# Patient Record
Sex: MALE | Race: WHITE | NOT HISPANIC OR LATINO | Employment: FULL TIME | URBAN - METROPOLITAN AREA
[De-identification: names, ages, dates, MRNs, and addresses within clinical notes are randomized per-mention and may not be internally consistent; named-entity substitution may affect disease eponyms.]

---

## 2017-02-16 ENCOUNTER — OFFICE VISIT (OUTPATIENT)
Dept: FAMILY MEDICINE | Facility: CLINIC | Age: 22
End: 2017-02-16
Payer: COMMERCIAL

## 2017-02-16 VITALS
SYSTOLIC BLOOD PRESSURE: 126 MMHG | HEART RATE: 65 BPM | WEIGHT: 176.8 LBS | DIASTOLIC BLOOD PRESSURE: 79 MMHG | BODY MASS INDEX: 23.95 KG/M2 | HEIGHT: 72 IN | TEMPERATURE: 97.1 F | OXYGEN SATURATION: 100 %

## 2017-02-16 DIAGNOSIS — M67.40 GANGLION CYST: Primary | ICD-10-CM

## 2017-02-16 DIAGNOSIS — Z23 NEED FOR PROPHYLACTIC VACCINATION WITH TETANUS-DIPHTHERIA (TD): ICD-10-CM

## 2017-02-16 DIAGNOSIS — Z23 NEED FOR PROPHYLACTIC VACCINATION WITH COMBINED VACCINE: ICD-10-CM

## 2017-02-16 PROCEDURE — 99213 OFFICE O/P EST LOW 20 MIN: CPT | Mod: 25 | Performed by: FAMILY MEDICINE

## 2017-02-16 PROCEDURE — 90471 IMMUNIZATION ADMIN: CPT | Performed by: FAMILY MEDICINE

## 2017-02-16 PROCEDURE — 90715 TDAP VACCINE 7 YRS/> IM: CPT | Performed by: FAMILY MEDICINE

## 2017-02-16 NOTE — NURSING NOTE
Screening Questionnaire for Adult Immunization    Are you sick today?   No   Do you have allergies to medications, food, a vaccine component or latex?   No   Have you ever had a serious reaction after receiving a vaccination?   No   Do you have a long-term health problem with heart disease, lung disease, asthma, kidney disease, metabolic disease (e.g. diabetes), anemia, or other blood disorder?   No   Do you have cancer, leukemia, HIV/AIDS, or any other immune system problem?   No   In the past 3 months, have you taken medications that affect  your immune system, such as prednisone, other steroids, or anticancer drugs; drugs for the treatment of rheumatoid arthritis, Crohn s disease, or psoriasis; or have you had radiation treatments?   No   Have you had a seizure, or a brain or other nervous system problem?   No   During the past year, have you received a transfusion of blood or blood     products, or been given immune (gamma) globulin or antiviral drug?   No   For women: Are you pregnant or is there a chance you could become        pregnant during the next month?   No   Have you received any vaccinations in the past 4 weeks?   No     Immunization questionnaire answers were all negative.      MNVFC doesn't apply on this patient   Patient instructed to remain in clinic for 20 minutes afterwards, and to report any adverse reaction to me immediately.       Screening performed by Clary Ruiz on 2/16/2017 at 9:31 AM.

## 2017-02-16 NOTE — MR AVS SNAPSHOT
After Visit Summary   2/16/2017    Vijay Coreas    MRN: [id number]           Patient Information     Date Of Birth          1995        Visit Information        Provider Department      2/16/2017 8:45 AM Leobardo Daily MD [facility]        Today's Diagnoses     Ganglion cyst    -  1    Need for prophylactic vaccination with tetanus-diphtheria (TD)        Need for prophylactic vaccination with combined vaccine           Follow-ups after your visit        Additional Services     ORTHOPEDICS ADULT REFERRAL       Your provider has referred you to: [facility] [phone]   [email or web address]    Please be aware that coverage of these services is subject to the terms and limitations of your health insurance plan.  Call member services at your health plan with any benefit or coverage questions.      Please bring the following to your appointment:    >>   Any x-rays, CTs or MRIs which have been performed.  Contact the facility where they were done to arrange for  prior to your scheduled appointment.    >>   List of current medications   >>   This referral request   >>   Any documents/labs given to you for this referral                  Future tests that were ordered for you today     Open Standing Orders        Priority Remaining Interval Expires Ordered    HUMAN PAPILLOMA VIRUS (GARDASIL 9) VACCINE Routine 3/3  1/16/2018 2/16/2017            Who to contact     If you have questions or need follow up information about today's clinic visit or your schedule please contact [facility] directly at [phone].  Normal or non-critical lab and imaging results will be communicated to you by MyChart, letter or phone within 4 business days after the clinic has received the results. If you do not hear from us within 7 days, please contact the clinic through MyChart or phone. If you have a critical or abnormal lab result, we will  notify you by phone as soon as possible.  Submit refill requests through [employer] or call your pharmacy and they will forward the refill request to us. Please allow 3 business days for your refill to be completed.          Additional Information About Your Visit        [employer] Information     [employer] gives you secure access to your electronic health record. If you see a primary care provider, you can also send messages to your care team and make appointments. If you have questions, please call your primary care clinic.  If you do not have a primary care provider, please call [phone] and they will assist you.        Care EveryWhere ID     This is your Care EveryWhere ID. This could be used by other organizations to access your [place] medical records  [id number]        Your Vitals Were     Pulse Temperature Height Pulse Oximetry BMI (Body Mass Index)       65 97.1  F (36.2  C) (Oral) 6' (1.829 m) 100% 23.98 kg/m2        Blood Pressure from Last 3 Encounters:   02/16/17 126/79   11/02/16 100/60   10/07/15 134/89    Weight from Last 3 Encounters:   02/16/17 176 lb 12.8 oz (80.2 kg)   11/02/16 176 lb (79.8 kg)   07/28/15 173 lb 8 oz (78.7 kg)              We Performed the Following     ORTHOPEDICS ADULT REFERRAL     TDAP (ADACEL AGES 11-64)        Primary Care Provider Office Phone # Fax #    Leobardo Daily [phone]       [facility] [address] [address]        Thank you!     Thank you for choosing [facility]  for your care. Our goal is always to provide you with excellent care. Hearing back from our patients is one way we can continue to improve our services. Please take a few minutes to complete the written survey that you may receive in the mail after your visit with us. Thank you!             Your Updated Medication List - Protect others around you: Learn how to safely use, store and throw away your medicines at www.disposemymeds.org.           This list is accurate as of: 2/16/17 11:44 AM.  Always use your most recent med list.                   Brand Name Dispense Instructions for use    HYDROcodone-acetaminophen 5-325 MG per tablet    NORCO    15 tablet    Take 1-2 tablets by mouth every 4 hours as needed for moderate to severe pain

## 2017-02-16 NOTE — NURSING NOTE
Chief Complaint   Patient presents with     Derm Problem     lump on right wriste, doing anything physical it  swells up and is painful  -      /79  Pulse 65  Temp 97.1  F (36.2  C) (Oral)  Ht 6' (1.829 m)  Wt 176 lb 12.8 oz (80.2 kg)  SpO2 100%  BMI 23.98 kg/m2 Estimated body mass index is 23.98 kg/(m^2) as calculated from the following:    Height as of this encounter: 6' (1.829 m).    Weight as of this encounter: 176 lb 12.8 oz (80.2 kg).  BP completed using cuff size: regular       Health Maintenance due pending provider review:  NONE    n/a      Clary Ruiz CMA

## 2017-02-16 NOTE — PROGRESS NOTES
Subjective: We have talked about the ganglion cyst on his right wrist in the pastbut it really didn't bother him. He thinks it's been there for years. Now if he does workouts it will get really swollen, twice its normal size, and hurt for days. He also needs some immunization updating and we talked about HPV and he'll think about it.    Objective: There is a small ganglion cyst on the right dorsum of the wrist. It is not particularly painful    Assessment and plan: Small ganglion cyst right wrist. It is too small for me to hope to drain and inject but I referred him to orthopedics to do that. Since it's causing somatic symptoms I think it makes sense. The cough

## 2017-12-11 ENCOUNTER — OFFICE VISIT (OUTPATIENT)
Dept: FAMILY MEDICINE | Facility: CLINIC | Age: 22
End: 2017-12-11
Payer: COMMERCIAL

## 2017-12-11 VITALS
WEIGHT: 180.4 LBS | HEART RATE: 66 BPM | DIASTOLIC BLOOD PRESSURE: 74 MMHG | OXYGEN SATURATION: 100 % | HEIGHT: 72 IN | SYSTOLIC BLOOD PRESSURE: 116 MMHG | TEMPERATURE: 97.9 F | BODY MASS INDEX: 24.43 KG/M2

## 2017-12-11 DIAGNOSIS — R21 RASH: Primary | ICD-10-CM

## 2017-12-11 PROCEDURE — 99213 OFFICE O/P EST LOW 20 MIN: CPT | Performed by: FAMILY MEDICINE

## 2017-12-11 RX ORDER — CLINDAMYCIN PHOSPHATE 11.9 MG/ML
SOLUTION TOPICAL 2 TIMES DAILY
Qty: 60 ML | Refills: 11 | Status: SHIPPED | OUTPATIENT
Start: 2017-12-11

## 2017-12-11 NOTE — PROGRESS NOTES
SUBJECTIVE:   Vijay Coreas is a 22 year old male who presents to clinic today for the following health issues:    Rash      Duration: 1 week    Description  Location: Lt armpit  Itching: some itch and uncomfortable    Intensity:  mild    Accompanying signs and symptoms: uncomfortable,some congestion/allergies and minor cold sxs    History (similar episodes/previous evaluation): None    Precipitating or alleviating factors:  New exposures:  None  Recent travel: no      Therapies tried and outcome: hydrocortisone cream, anti-fungal -  not effective      22-year-old who presents to clinic for evaluation of skin rash that have been present for the past 1 week.  Patient reports that he wear and use sweater 1 week ago and he felt that he was too tight on his armpits.  It is only under his left armpit.  He tried topical hydrocortisone cream without relief.  Yesterday he tried topical antifungal cream without relief as well.  Rash has been the same over the past 1 week.  Yesterday he also noticed a similar rash on his suprapubic area.      Reviewed and updated as needed this visit by clinical staff      ROS:  Constitutional, HEENT, cardiovascular, pulmonary, gi and gu systems are negative, except as otherwise noted.      OBJECTIVE:   /74  Pulse 66  Temp 97.9  F (36.6  C) (Oral)  Ht 6' (1.829 m)  Wt 180 lb 6.4 oz (81.8 kg)  SpO2 100%  BMI 24.47 kg/m2  Body mass index is 24.47 kg/(m^2).  GENERAL: healthy, alert and no distress  SKIN: left axillary area: Scattered erythematous isolated papules with surrounding erythema, nonblanching. Not related to hair follicles.  Similar rash on the suprapubic area.  PSYCH: mentation appears normal, affect normal/bright      ASSESSMENT/PLAN:   1. Rash  Assessment: Rash is consistent with a bug bite related inflammatory papules.  The patient attempted topical steroids as well as antifungal ointment without relief.  He was given clindamycin solution to use on the suprapubic  area as well as left Axillary area.  Plan:  - clindamycin (CLEOCIN T) 1 % solution; Apply topically 2 times daily  Dispense: 60 mL; Refill: 11      Carolian Johnson MD  [facility]  PAGER: [phone]

## 2017-12-11 NOTE — MR AVS SNAPSHOT
After Visit Summary   12/11/2017    Vijay Coreas    MRN: [id number]           Patient Information     Date Of Birth          1995        Visit Information        Provider Department      12/11/2017 12:30 PM Carolina Johnson MD [facility]        Today's Diagnoses     Rash    -  1      Care Instructions      Understanding Contact Dermatitis     A cool, moist compress can help reduce itching.     Contact dermatitis is a common type of skin rash. It s caused by something that touches the skin and makes it irritated and inflamed. It can occur on skin on any part of the body, such as the face, neck, hands, arms, and legs. Contact dermatitis is not spread from person to person.  Often, the reaction of contact dermatitis occurs 1 to 2 days after contact with the offending agent.  How to say it  SHIRLEY-tact [id number]-VK-[id number]   What causes contact dermatitis?  It s caused by something that irritates the skin, or that creates an allergic reaction on the skin. People can get contact dermatitis from many kinds of things. These include:    Plant oils in poison ivy, oak, and sumac    Chemicals in household , solvents, and glue    Chemicals in makeup, soap, laundry detergent, perfume, acne cream, and hair products    Certain medicines, such as neomycin, bacitracin, benzocaine, and thimerosal    Metals such as nickel, found in some jewelry and watch bands     The sticky material on the back of bandages and tape (adhesive)    Things that can cause tiny breaks in the skin, such as wood, fiberglass, metal tools, and plant thorns    Rubber latex in surgical gloves and other medical supplies  Dermatitis can also be caused by the skin being damp for long periods of time. This can happen from washing your hands too often, or working with wet materials.  Symptoms of contact dermatitis  Symptoms can include skin that is:    Blistered    Burning    Cracked    Dry    Itchy    Painful    Red    Rough,  thickened, and leathery    Swollen    Warm  The blisters may ooze fluid and form crusts.  Treatment for contact dermatitis  Treatment is done to help relieve itching and reduce inflammation. The rash should go away in a few days to a few weeks. Treatments include:    Cool, moist compress. Use a clean damp cloth. Put it on the area for 20 to 30 minutes, 5 to 6 times a day for the first 3 days.    Steroid cream or ointment. You can apply this medicine several times a day on clean skin.    Oral corticosteroid. Your healthcare provider may prescribe this medicine if you have severe skin symptoms on a large part of your body.  Your healthcare provider may give you a steroid injection instead of pills.    Oral antihistamine. This medicine can help reduce itching.    Colloidal oatmeal bath. Soaking in water with colloidal oatmeal can help soothe skin.    Plain cream, lotion, or ointment. Cream, lotion, or ointment without medicine can help to soothe and protect your skin.  Living with contact dermatitis  Talk with your healthcare provider about what may have caused your contact dermatitis. Patch testing may help you figure out what caused the rash so you can avoid further contact with it. Once you learn what caused your rash, make sure to avoid that substance. If your skin comes into contact with it again, make sure to wash your skin right away. If you can t avoid the substance, wear gloves or other protective clothing before you touch it. Or use a cream, lotion, or ointment to protect your skin.  When to call your healthcare provider  Call your healthcare provider right away if you have any of these:    Fever of 100.4 F (38 C) or higher, or as directed    Symptoms that don t get better, or get worse    New symptoms   Date Last Reviewed: 5/1/2016 2000-2017 [employer]. [address] [place] [address], [place] [address]. All rights reserved. This information is not intended as a substitute for professional medical  care. Always follow your healthcare professional's instructions.                Follow-ups after your visit        Who to contact     If you have questions or need follow up information about today's clinic visit or your schedule please contact [facility] directly at [phone].  Normal or non-critical lab and imaging results will be communicated to you by [employer], letter or phone within 4 business days after the clinic has received the results. If you do not hear from us within 7 days, please contact the clinic through [employer] or phone. If you have a critical or abnormal lab result, we will notify you by phone as soon as possible.  Submit refill requests through [employer] or call your pharmacy and they will forward the refill request to us. Please allow 3 business days for your refill to be completed.          Additional Information About Your Visit        MyChart Information     [employer] gives you secure access to your electronic health record. If you see a primary care provider, you can also send messages to your care team and make appointments. If you have questions, please call your primary care clinic.  If you do not have a primary care provider, please call [phone] and they will assist you.        Care EveryWhere ID     This is your Care EveryWhere ID. This could be used by other organizations to access your [place] medical records  [id number]        Your Vitals Were     Pulse Temperature Height Pulse Oximetry BMI (Body Mass Index)       66 97.9  F (36.6  C) (Oral) 6' (1.829 m) 100% 24.47 kg/m2        Blood Pressure from Last 3 Encounters:   12/11/17 116/74   02/16/17 126/79   11/02/16 100/60    Weight from Last 3 Encounters:   12/11/17 180 lb 6.4 oz (81.8 kg)   02/16/17 176 lb 12.8 oz (80.2 kg)   11/02/16 176 lb (79.8 kg)              Today, you had the following     No orders found for display         Today's Medication Changes          These changes are accurate as of: 12/11/17 12:46 PM.   If you have any questions, ask your nurse or doctor.               Start taking these medicines.        Dose/Directions    clindamycin 1 % solution   Commonly known as:  CLEOCIN T   Used for:  Rash   Started by:  Carolina Johnson MD        Apply topically 2 times daily   Quantity:  60 mL   Refills:  11            Where to get your medicines      These medications were sent to [facility] [id number]  [place], [place] - [address]  [address] [place] [address]     Phone:  [phone]     clindamycin 1 % solution                Primary Care Provider Office Phone # Fax #    Leobardo Daily [phone] [address] [facility] [address] [address]        Equal Access to Services     LESLEY JOSEPH : Hadii sheldon drummond hadasho Soomaali, waaxda luqadaha, qaybta kaalmada adeegyada, lottie su hayluigi lópez . So [facility] [phone].    ATENCIÓN: Si habla español, tiene a mello disposición servicios gratuitos de asistencia lingüística. Llame al [phone].    We comply with applicable federal civil rights laws and [place] laws. We do not discriminate on the basis of race, color, national origin, age, disability, sex, sexual orientation, or gender identity.            Thank you!     Thank you for choosing [facility]  for your care. Our goal is always to provide you with excellent care. Hearing back from our patients is one way we can continue to improve our services. Please take a few minutes to complete the written survey that you may receive in the mail after your visit with us. Thank you!             Your Updated Medication List - Protect others around you: Learn how to safely use, store and throw away your medicines at www.disposemymeds.org.          This list is accurate as of: 12/11/17 12:46 PM.  Always use your most recent med list.                   Brand Name Dispense Instructions for use Diagnosis    clindamycin 1 %  solution    CLEOCIN T    60 mL    Apply topically 2 times daily    Rash

## 2017-12-11 NOTE — PATIENT INSTRUCTIONS
Understanding Contact Dermatitis     A cool, moist compress can help reduce itching.     Contact dermatitis is a common type of skin rash. It s caused by something that touches the skin and makes it irritated and inflamed. It can occur on skin on any part of the body, such as the face, neck, hands, arms, and legs. Contact dermatitis is not spread from person to person.  Often, the reaction of contact dermatitis occurs 1 to 2 days after contact with the offending agent.  How to say it  SHIRLEY-tact geo-anh-UI-tis   What causes contact dermatitis?  It s caused by something that irritates the skin, or that creates an allergic reaction on the skin. People can get contact dermatitis from many kinds of things. These include:    Plant oils in poison ivy, oak, and sumac    Chemicals in household , solvents, and glue    Chemicals in makeup, soap, laundry detergent, perfume, acne cream, and hair products    Certain medicines, such as neomycin, bacitracin, benzocaine, and thimerosal    Metals such as nickel, found in some jewelry and watch bands     The sticky material on the back of bandages and tape (adhesive)    Things that can cause tiny breaks in the skin, such as wood, fiberglass, metal tools, and plant thorns    Rubber latex in surgical gloves and other medical supplies  Dermatitis can also be caused by the skin being damp for long periods of time. This can happen from washing your hands too often, or working with wet materials.  Symptoms of contact dermatitis  Symptoms can include skin that is:    Blistered    Burning    Cracked    Dry    Itchy    Painful    Red    Rough, thickened, and leathery    Swollen    Warm  The blisters may ooze fluid and form crusts.  Treatment for contact dermatitis  Treatment is done to help relieve itching and reduce inflammation. The rash should go away in a few days to a few weeks. Treatments include:    Cool, moist compress. Use a clean damp cloth. Put it on the area for 20 to 30  minutes, 5 to 6 times a day for the first 3 days.    Steroid cream or ointment. You can apply this medicine several times a day on clean skin.    Oral corticosteroid. Your healthcare provider may prescribe this medicine if you have severe skin symptoms on a large part of your body.  Your healthcare provider may give you a steroid injection instead of pills.    Oral antihistamine. This medicine can help reduce itching.    Colloidal oatmeal bath. Soaking in water with colloidal oatmeal can help soothe skin.    Plain cream, lotion, or ointment. Cream, lotion, or ointment without medicine can help to soothe and protect your skin.  Living with contact dermatitis  Talk with your healthcare provider about what may have caused your contact dermatitis. Patch testing may help you figure out what caused the rash so you can avoid further contact with it. Once you learn what caused your rash, make sure to avoid that substance. If your skin comes into contact with it again, make sure to wash your skin right away. If you can t avoid the substance, wear gloves or other protective clothing before you touch it. Or use a cream, lotion, or ointment to protect your skin.  When to call your healthcare provider  Call your healthcare provider right away if you have any of these:    Fever of 100.4 F (38 C) or higher, or as directed    Symptoms that don t get better, or get worse    New symptoms   Date Last Reviewed: 5/1/2016 2000-2017 [employer]. [address] [place] [address], [place] [address]. All rights reserved. This information is not intended as a substitute for professional medical care. Always follow your healthcare professional's instructions.

## 2018-06-26 ENCOUNTER — OFFICE VISIT (OUTPATIENT)
Dept: FAMILY MEDICINE | Facility: CLINIC | Age: 23
End: 2018-06-26
Payer: COMMERCIAL

## 2018-06-26 VITALS
HEIGHT: 72 IN | DIASTOLIC BLOOD PRESSURE: 76 MMHG | OXYGEN SATURATION: 100 % | HEART RATE: 70 BPM | RESPIRATION RATE: 16 BRPM | BODY MASS INDEX: 23.07 KG/M2 | WEIGHT: 170.3 LBS | TEMPERATURE: 98.1 F | SYSTOLIC BLOOD PRESSURE: 120 MMHG

## 2018-06-26 DIAGNOSIS — L28.2 PRURITIC RASH: Primary | ICD-10-CM

## 2018-06-26 PROCEDURE — 99213 OFFICE O/P EST LOW 20 MIN: CPT | Performed by: PHYSICIAN ASSISTANT

## 2018-06-26 RX ORDER — PERMETHRIN 50 MG/G
CREAM TOPICAL
Qty: 60 G | Refills: 1 | Status: SHIPPED | OUTPATIENT
Start: 2018-06-26

## 2018-06-26 NOTE — MR AVS SNAPSHOT
After Visit Summary   6/26/2018    Vijay Coreas    MRN: [id number]           Patient Information     Date Of Birth          1995        Visit Information        Provider Department      6/26/2018 5:20 PM Aden Buitrago PA-C [facility]        Today's Diagnoses     Pruritic rash    -  1       Follow-ups after your visit        Who to contact     If you have questions or need follow up information about today's clinic visit or your schedule please contact [facility] directly at [phone].  Normal or non-critical lab and imaging results will be communicated to you by iWardahart, letter or phone within 4 business days after the clinic has received the results. If you do not hear from us within 7 days, please contact the clinic through [employer] or phone. If you have a critical or abnormal lab result, we will notify you by phone as soon as possible.  Submit refill requests through [employer] or call your pharmacy and they will forward the refill request to us. Please allow 3 business days for your refill to be completed.          Additional Information About Your Visit        MyChart Information     [employer] gives you secure access to your electronic health record. If you see a primary care provider, you can also send messages to your care team and make appointments. If you have questions, please call your primary care clinic.  If you do not have a primary care provider, please call [phone] and they will assist you.        Care EveryWhere ID     This is your Care EveryWhere ID. This could be used by other organizations to access your [place] medical records  [id number]        Your Vitals Were     Pulse Temperature Respirations Height Pulse Oximetry BMI (Body Mass Index)    70 98.1  F (36.7  C) (Oral) 16 6' (1.829 m) 100% 23.1 kg/m2       Blood Pressure from Last 3 Encounters:   06/26/18 120/76   12/11/17 116/74   02/16/17 126/79    Weight from Last 3 Encounters:    06/26/18 170 lb 4.8 oz (77.2 kg)   12/11/17 180 lb 6.4 oz (81.8 kg)   02/16/17 176 lb 12.8 oz (80.2 kg)              Today, you had the following     No orders found for display         Today's Medication Changes          These changes are accurate as of 6/26/18  5:30 PM.  If you have any questions, ask your nurse or doctor.               Start taking these medicines.        Dose/Directions    permethrin 5 % cream   Commonly known as:  ELIMITE   Used for:  Pruritic rash   Started by:  Aden Buitrago PA-C        Apply cream from head to toe (except the face); leave on for 8-14 hours then wash off with water; reapply in 1 week if live mites appear.   Quantity:  60 g   Refills:  1            Where to get your medicines      These medications were sent to [facility] [address] [place] [address] [address] [employer] [employer] [address] [place] [address] [facility] [address]     Phone:  [phone]     permethrin 5 % cream                Primary Care Provider Office Phone # Fax #    Leobardo Daily [phone] [address] [address]        Equal Access to Services     LESLEY JOSEPH AH: Hadii sheldon ku hadasho Soomaali, waaxda luqadaha, qaybta kaalmada adeegyada, waxay kathyin hayluigi medrano. So [facility] [phone].    ATENCIÓN: Si habla español, tiene a mello disposición servicios gratuitos de asistencia lingüística. Llame al [phone].    We comply with applicable federal civil rights laws and [place] laws. We do not discriminate on the basis of race, color, national origin, age, disability, sex, sexual orientation, or gender identity.            Thank you!     Thank you for choosing [facility]  for your care. Our goal is always to provide you with excellent care. Hearing back from our patients is one way we can continue to improve our services. Please take a few minutes to complete the written survey that you may receive in the  mail after your visit with us. Thank you!             Your Updated Medication List - Protect others around you: Learn how to safely use, store and throw away your medicines at www.disposemymeds.org.          This list is accurate as of 6/26/18  5:30 PM.  Always use your most recent med list.                   Brand Name Dispense Instructions for use Diagnosis    clindamycin 1 % solution    CLEOCIN T    60 mL    Apply topically 2 times daily    Rash       permethrin 5 % cream    ELIMITE    60 g    Apply cream from head to toe (except the face); leave on for 8-14 hours then wash off with water; reapply in 1 week if live mites appear.    Pruritic rash

## 2018-06-26 NOTE — PROGRESS NOTES
SUBJECTIVE:   Vijay Coreas is a 23 year old male who presents to clinic today for the following health issues:      Chief Complaint   Patient presents with     Derm Problem     Rash on hips, buttocks and groin.  Had this 2 months ago and it had gotten better, but now it's back and it's worse.     Rash is very itchy.        Problem list and histories reviewed & adjusted, as indicated.  Additional history: 24 y/o male here for evaluation of very itchy rash for the last couple of weeks.  This started along his waistline and has spread to buttocks, groin, and upper thighs.  Very itchy.  He is very avid running, so he kept thinking it was just irritation from that.  He has an arm pit rash a few months back, but he states that looked different.  He has not done much treatment for this.      BP Readings from Last 3 Encounters:   06/26/18 120/76   12/11/17 116/74   02/16/17 126/79    Wt Readings from Last 3 Encounters:   06/26/18 170 lb 4.8 oz (77.2 kg)   12/11/17 180 lb 6.4 oz (81.8 kg)   02/16/17 176 lb 12.8 oz (80.2 kg)                    Reviewed and updated as needed this visit by clinical staff  Tobacco  Allergies  Meds  Problems  Med Hx  Surg Hx  Fam Hx  Soc Hx        Reviewed and updated as needed this visit by Provider         ROS:  Constitutional, HEENT, cardiovascular, pulmonary, gi and gu systems are negative, except as otherwise noted.    OBJECTIVE:     /76  Pulse 70  Temp 98.1  F (36.7  C) (Oral)  Resp 16  Ht 6' (1.829 m)  Wt 170 lb 4.8 oz (77.2 kg)  SpO2 100%  BMI 23.1 kg/m2  Body mass index is 23.1 kg/(m^2).  GENERAL: alert and no distress  EYES: Eyes grossly normal to inspection  SKIN: erythematous pustules/papules over waist line, buttock, upper thigh and shaft penis.  Many with eschar from pruritis.  No jo ann otilia noticed.  PSYCH: mentation appears normal, affect normal/bright    Diagnostic Test Results:  none     ASSESSMENT/PLAN:             1. Pruritic rash  The location  and history does raise concern for scabies.  Will treat based on this.  Does have appearance of folliculitis as well, but since that does not usually itch like this, will reserve that treatment in case this fails.    - permethrin (ELIMITE) 5 % cream; Apply cream from head to toe (except the face); leave on for 8-14 hours then wash off with water; reapply in 1 week if live mites appear.  Dispense: 60 g; Refill: 1        Aden Buitrago PA-C  [facility]

## 2019-07-16 ENCOUNTER — OFFICE VISIT (OUTPATIENT)
Dept: FAMILY MEDICINE | Facility: CLINIC | Age: 24
End: 2019-07-16
Payer: COMMERCIAL

## 2019-07-16 VITALS
OXYGEN SATURATION: 100 % | RESPIRATION RATE: 16 BRPM | HEART RATE: 75 BPM | WEIGHT: 177 LBS | HEIGHT: 72 IN | BODY MASS INDEX: 23.98 KG/M2 | TEMPERATURE: 98.6 F | DIASTOLIC BLOOD PRESSURE: 87 MMHG | SYSTOLIC BLOOD PRESSURE: 122 MMHG

## 2019-07-16 DIAGNOSIS — S16.1XXA STRAIN OF NECK MUSCLE, INITIAL ENCOUNTER: Primary | ICD-10-CM

## 2019-07-16 PROCEDURE — 99213 OFFICE O/P EST LOW 20 MIN: CPT | Performed by: PHYSICIAN ASSISTANT

## 2019-07-16 RX ORDER — METHOCARBAMOL 500 MG/1
500 TABLET, FILM COATED ORAL 4 TIMES DAILY PRN
Qty: 20 TABLET | Refills: 0 | Status: SHIPPED | OUTPATIENT
Start: 2019-07-16

## 2019-07-16 ASSESSMENT — MIFFLIN-ST. JEOR: SCORE: 1825.87

## 2019-07-16 NOTE — NURSING NOTE
Chief Complaint   Patient presents with     Back Pain     initial /87 (BP Location: Left arm, Cuff Size: Adult Regular)   Pulse 75   Temp 98.6  F (37  C) (Oral)   Resp 16   Ht 1.829 m (6')   Wt 80.3 kg (177 lb)   SpO2 100%   BMI 24.01 kg/m   Estimated body mass index is 24.01 kg/m  as calculated from the following:    Height as of this encounter: 1.829 m (6').    Weight as of this encounter: 80.3 kg (177 lb).  BP completed using cuff size: regular.  L  arm      Health Maintenance that is potentially due pending provider review:  NONE    n/a    Pierre De Los Santos ma

## 2019-07-16 NOTE — PROGRESS NOTES
Subjective     Vijay Coreas is a 24 year old male who presents to clinic today for the following health issues:    HPI   Back Pain       Duration: 6 days        Specific cause: turned head    Description:   Location of pain: upper back bilateral, neck bilateral and shoulders bilateral  Character of pain: sharp  Pain radiation:none  New numbness or weakness in legs, not attributed to pain:  no     Intensity: moderate    History:   Pain interferes with job: YES  History of back problems: no prior back problems  Any previous MRI or X-rays: None  Sees a specialist for back pain:  No  Therapies tried without relief: ibuprofen and muscle relaxants    Alleviating factors:   Improved by: muscle relaxants      Precipitating factors:  Worsened by: Sitting          Accompanying Signs & Symptoms:  Risk of Fracture:  None  Risk of Cauda Equina:  None  Risk of Infection:  None  Risk of Cancer:  None  Risk of Ankylosing Spondylitis:  Onset at age <35, male, AND morning back stiffness. no                He has had bouts of this multiple times in the past.  He has never really followed up with PHYSICAL THERAPY or speciality in the past, and it has usually resolved.  He is interested in chiropractor.  Is a bit better today.  Did take Dad's muscle relaxer, unsure of name.,    BP Readings from Last 3 Encounters:   07/16/19 122/87   06/26/18 120/76   12/11/17 116/74    Wt Readings from Last 3 Encounters:   07/16/19 80.3 kg (177 lb)   06/26/18 77.2 kg (170 lb 4.8 oz)   12/11/17 81.8 kg (180 lb 6.4 oz)                      Reviewed and updated as needed this visit by Provider         Review of Systems   ROS COMP: Constitutional, HEENT, cardiovascular, pulmonary, gi and gu systems are negative, except as otherwise noted.      Objective    /87 (BP Location: Left arm, Cuff Size: Adult Regular)   Pulse 75   Temp 98.6  F (37  C) (Oral)   Resp 16   Ht 1.829 m (6')   Wt 80.3 kg (177 lb)   SpO2 100%   BMI 24.01 kg/m    Body  mass index is 24.01 kg/m .  Physical Exam   GENERAL: alert and no distress  EYES: Eyes grossly normal to inspection  RESP: lungs clear to auscultation - no rales, rhonchi or wheezes  CV: regular rate and rhythm, normal S1 S2, no S3 or S4, no murmur, click or rub, no peripheral edema and peripheral pulses strong  MS: no gross musculoskeletal defects noted, no edema    Diagnostic Test Results:  Labs reviewed in Epic        Assessment & Plan     1. Strain of neck muscle, initial encounter    - CHIROPRACTIC REFERRAL  - methocarbamol (ROBAXIN) 500 MG tablet; Take 1 tablet (500 mg) by mouth 4 times daily as needed for muscle spasms  Dispense: 20 tablet; Refill: 0           Return in about 4 weeks (around 8/13/2019) for If symptoms persist or worsen.    Aden Buitrago PA-C  [facility]

## 2019-10-03 ENCOUNTER — HEALTH MAINTENANCE LETTER (OUTPATIENT)
Age: 24
End: 2019-10-03

## 2019-11-12 ENCOUNTER — TELEPHONE (OUTPATIENT)
Dept: FAMILY MEDICINE | Facility: CLINIC | Age: 24
End: 2019-11-12

## 2019-11-12 DIAGNOSIS — G47.00 INSOMNIA, UNSPECIFIED TYPE: Primary | ICD-10-CM

## 2019-11-12 NOTE — TELEPHONE ENCOUNTER
Referral placed for evaluation from sleep health.  They would determine if sleep study appropriate.    Jhonatan Buitrago PA-C

## 2019-11-12 NOTE — TELEPHONE ENCOUNTER
Reason for call:  Pt is requesting a sleep study at [facility] for insomniac patterns.    Phone number to reach patient:  Cell number on file:    Telephone Information:   Mobile [phone]       Best Time:      Can we leave a detailed message on this number?  YES

## 2019-11-19 ENCOUNTER — OFFICE VISIT (OUTPATIENT)
Dept: FAMILY MEDICINE | Facility: CLINIC | Age: 24
End: 2019-11-19
Payer: COMMERCIAL

## 2019-11-19 VITALS
DIASTOLIC BLOOD PRESSURE: 62 MMHG | TEMPERATURE: 98.4 F | HEIGHT: 72 IN | BODY MASS INDEX: 23.7 KG/M2 | RESPIRATION RATE: 16 BRPM | SYSTOLIC BLOOD PRESSURE: 104 MMHG | HEART RATE: 58 BPM | OXYGEN SATURATION: 98 % | WEIGHT: 175 LBS

## 2019-11-19 DIAGNOSIS — N52.9 ERECTILE DYSFUNCTION, UNSPECIFIED ERECTILE DYSFUNCTION TYPE: Primary | ICD-10-CM

## 2019-11-19 PROCEDURE — 99213 OFFICE O/P EST LOW 20 MIN: CPT | Performed by: PHYSICIAN ASSISTANT

## 2019-11-19 RX ORDER — SILDENAFIL CITRATE 20 MG/1
TABLET ORAL
Qty: 30 TABLET | Refills: 1 | Status: SHIPPED | OUTPATIENT
Start: 2019-11-19 | End: 2021-04-13

## 2019-11-19 ASSESSMENT — MIFFLIN-ST. JEOR: SCORE: 1816.79

## 2019-11-19 NOTE — PATIENT INSTRUCTIONS
[id number] - SLEEP EVALUATION & MANAGEMENT REFERRAL - ADULT [facility] [phone]  (Age 18 and up)

## 2019-11-19 NOTE — NURSING NOTE
Chief Complaint   Patient presents with     Recheck Medication     Referral     sleep study     initial /62 (BP Location: Right arm, Cuff Size: Adult Regular)   Pulse 58   Temp 98.4  F (36.9  C) (Oral)   Resp 16   Ht 1.829 m (6')   Wt 79.4 kg (175 lb)   SpO2 98%   BMI 23.73 kg/m   Estimated body mass index is 23.73 kg/m  as calculated from the following:    Height as of this encounter: 1.829 m (6').    Weight as of this encounter: 79.4 kg (175 lb).  BP completed using cuff size: regular.  R arm      Health Maintenance that is potentially due pending provider review:  NONE    n/a    Pierre De Los Santos ma

## 2019-11-19 NOTE — PROGRESS NOTES
Subjective     Vijay Coreas is a 24 year old male who presents to clinic today for the following health issues:    HPI   Med check and referral for sleep study    He will be scheduling f/u with sleep health based on his insomnia.    He has long standing anxiety, mostly focused on intimacy. He has worked with sex therapist for a while, which has always been helpful.  He does wonder about viagra.  He has taken in past, and has helped.  Admits that he really only has trouble in a new relationship, and fully believes this is not a physical problem.    BP Readings from Last 3 Encounters:   11/19/19 104/62   07/16/19 122/87   06/26/18 120/76    Wt Readings from Last 3 Encounters:   11/19/19 79.4 kg (175 lb)   07/16/19 80.3 kg (177 lb)   06/26/18 77.2 kg (170 lb 4.8 oz)                      Reviewed and updated as needed this visit by Provider         Review of Systems   ROS COMP: Constitutional, HEENT, cardiovascular, pulmonary, gi and gu systems are negative, except as otherwise noted.      Objective    /62 (BP Location: Right arm, Cuff Size: Adult Regular)   Pulse 58   Temp 98.4  F (36.9  C) (Oral)   Resp 16   Ht 1.829 m (6')   Wt 79.4 kg (175 lb)   SpO2 98%   BMI 23.73 kg/m    Body mass index is 23.73 kg/m .  Physical Exam   GENERAL: alert and no distress  EYES: Eyes grossly normal to inspection  RESP: lungs clear to auscultation - no rales, rhonchi or wheezes  CV: regular rate and rhythm, normal S1 S2, no S3 or S4, no murmur, click or rub, no peripheral edema and peripheral pulses strong  PSYCH: mentation appears normal, affect normal/bright    Diagnostic Test Results:  Labs reviewed in Epic        Assessment & Plan     1. Erectile dysfunction, unspecified erectile dysfunction type  Most likely anxiety related, trial of low dose to see if helpful.  - sildenafil (REVATIO) 20 MG tablet; 20 mg 30 minutes prior to sexual activity.  Dispense: 30 tablet; Refill: 1           Return in about 6 months  (around 5/19/2020).    Aden Buitrago PA-C  [facility]

## 2020-03-05 ENCOUNTER — OFFICE VISIT (OUTPATIENT)
Dept: FAMILY MEDICINE | Facility: CLINIC | Age: 25
End: 2020-03-05
Payer: COMMERCIAL

## 2020-03-05 VITALS
WEIGHT: 181 LBS | BODY MASS INDEX: 24.52 KG/M2 | DIASTOLIC BLOOD PRESSURE: 86 MMHG | SYSTOLIC BLOOD PRESSURE: 123 MMHG | OXYGEN SATURATION: 100 % | HEART RATE: 81 BPM | HEIGHT: 72 IN

## 2020-03-05 DIAGNOSIS — Z71.84 TRAVEL ADVICE ENCOUNTER: Primary | ICD-10-CM

## 2020-03-05 PROCEDURE — 90471 IMMUNIZATION ADMIN: CPT | Performed by: NURSE PRACTITIONER

## 2020-03-05 PROCEDURE — 99402 PREV MED CNSL INDIV APPRX 30: CPT | Mod: 25 | Performed by: NURSE PRACTITIONER

## 2020-03-05 PROCEDURE — 90686 IIV4 VACC NO PRSV 0.5 ML IM: CPT | Performed by: NURSE PRACTITIONER

## 2020-03-05 RX ORDER — ATOVAQUONE AND PROGUANIL HYDROCHLORIDE 250; 100 MG/1; MG/1
1 TABLET, FILM COATED ORAL DAILY
Qty: 44 TABLET | Refills: 0 | Status: SHIPPED | OUTPATIENT
Start: 2020-03-05

## 2020-03-05 RX ORDER — AZITHROMYCIN 500 MG/1
500 TABLET, FILM COATED ORAL DAILY
Qty: 3 TABLET | Refills: 0 | Status: SHIPPED | OUTPATIENT
Start: 2020-03-05 | End: 2020-03-08

## 2020-03-05 ASSESSMENT — MIFFLIN-ST. JEOR: SCORE: 1844.01

## 2020-03-05 NOTE — PROGRESS NOTES
Itinerary:  [place] , [place] a couple of days [place]   [place], [place],  [place]   [place]     Departure Date: 5/18/2020  Traveling with 1 friend     Return Date: 6/8/2020    Length of Trip: 3 weeks    Purpose of Trip: Pleasure    Urban/Rural: Both    Accommodations: Hotel, Hostel, Airbnb    IMMUNIZATION HISTORY  Have you received any immunizations within the past 4 weeks?  No  Have you ever fainted from having your blood drawn or from an injection?  No  Have you ever had a fever reaction to vaccination?  No  Have you ever had any bad reaction or side effect from any vaccination?  No  Have you ever had hepatitis A or B vaccine?  Yes  Do you live (or work closely) with anyone who has AIDS, an AIDS-like condition, any other immune disorder or who is on chemotherapy for cancer or a   family history of immunodeficiency?  No  Have you received any injection of immune globulin or any blood products during the past 12 months?  No    Patient roomed by Lupe Loaiza MA  Medical Assistant  [facility]      Special medical concerns: none    /86   Pulse 81   Ht 1.829 m (6')   Wt 82.1 kg (181 lb)   SpO2 100%   BMI 24.55 kg/m    EXAM: deferred    Immunizations discussed include: Future order for  Rabies and Japanese Encephalitis , Oral typhoid and flu shot   Malaraia prophylaxis recommended: Malarone  Symptomatic treatment for traveler's diarrhea: azithromycin    Personal protective measures reviewed including hand sanitizing and contact precautions for the prevention of viral illnesses. Cover coughs and masking if ill during travel and upon return.  Current COVID 19 outbreak.  Monitor / follow current CDC guidelines.      ASSESSMENT/PLAN:    ICD-10-CM    1. Travel advice encounter Z71.84 azithromycin (ZITHROMAX) 500 MG tablet     typhoid (VIVOTIF) CR capsule     atovaquone-proguanil (MALARONE) 250-100 MG tablet     I have reviewed general recommendations for safe travel    including: food/water precautions, insect avoidance, safe sex   practices given high prevalence of HIV and other STDs,   roadway safety. Educational materials and [employer] report provided.    Sign up with [employer] and get Evacuation Insurance    Total visit time 30 minutes with over 50% of time spent counseling patient.    Graciela Khanna (Lori) CNP

## 2020-03-05 NOTE — PATIENT INSTRUCTIONS
Today March 5, 2020 you received the    Flu Vaccine   Oral Typhoid    Future   Rabies Days  0 , 7 , 21 or 28  Greenlandic Encephalitis  Days 0 and Day 7 or 28  .    These appointments can be made as a NURSE ONLY visit.    **It is very important for the vaccinations to be given on the scheduled day(s), this helps ensure you receive the full effectiveness of the vaccine.**    Please call [facility] with any questions [phone]    Thank you for visiting [facility]

## 2020-03-10 ENCOUNTER — TELEPHONE (OUTPATIENT)
Dept: FAMILY MEDICINE | Facility: CLINIC | Age: 25
End: 2020-03-10

## 2020-03-10 NOTE — TELEPHONE ENCOUNTER
Prior Authorization Retail Medication Request    Medication/Dose: typhoid (VIVOTIF) CR capsule   ICD code (if different than what is on RX):    Previously Tried and Failed:    Rationale:      Insurance Name:  [phone]  Insurance ID:  [id number]      Pharmacy Information (if different than what is on RX)  Name:  [facility] [address]  Phone:  [phone]

## 2020-03-12 ENCOUNTER — OFFICE VISIT (OUTPATIENT)
Dept: SLEEP MEDICINE | Facility: CLINIC | Age: 25
End: 2020-03-12
Attending: PHYSICIAN ASSISTANT
Payer: COMMERCIAL

## 2020-03-12 VITALS
SYSTOLIC BLOOD PRESSURE: 124 MMHG | BODY MASS INDEX: 23.98 KG/M2 | RESPIRATION RATE: 16 BRPM | WEIGHT: 177 LBS | DIASTOLIC BLOOD PRESSURE: 84 MMHG | HEIGHT: 72 IN | OXYGEN SATURATION: 100 % | HEART RATE: 74 BPM

## 2020-03-12 DIAGNOSIS — F51.8 ABNORMAL DREAMS: ICD-10-CM

## 2020-03-12 DIAGNOSIS — F41.9 ANXIETY: ICD-10-CM

## 2020-03-12 DIAGNOSIS — G47.00 INSOMNIA, UNSPECIFIED TYPE: Primary | ICD-10-CM

## 2020-03-12 DIAGNOSIS — F32.A DEPRESSION, UNSPECIFIED DEPRESSION TYPE: ICD-10-CM

## 2020-03-12 PROCEDURE — 99214 OFFICE O/P EST MOD 30 MIN: CPT | Performed by: PHYSICIAN ASSISTANT

## 2020-03-12 ASSESSMENT — MIFFLIN-ST. JEOR: SCORE: 1825.87

## 2020-03-12 NOTE — PATIENT INSTRUCTIONS
Cognitive Behavioral Therapy for Insomnia (CBT-I)    What is CBT-I?    Cognitive Behavioral Therapy for Insomnia, also known as CBT-I, is a highly effective non-drug treatment for insomnia. The American College of Physicians recommends CBT-I as the first treatment for chronic insomnia.  Research has shown CBT-I to be safer and more effective long term than sleeping pills.    What does CBT-I involve?     CBT-I targets behaviors that lead to chronic insomnia:    Habits that weaken the bed as a cue for sleep    Habits that weaken your body's sleep drive and sleep/wake clock     Unhelpful sleep thoughts that increase sleep-related worry and arousal.    The process works like a training program that provides you with the information and coaching needed to implement proven strategies to get a better night's sleep.    The [facility] offers several effective treatment options.  You can do CBT-I from the convenience of your own home through our Online CBT-I and Virtual CBT-I options. Our program also offers in-person CBTI-I at certain primary care clinics, specialty clinics, and   [facility].    How much effort will it take?    To get the full benefit from CBT-I, you will need to put into practice the strategies recommended as part of your personalized program.  You will keep a daily sleep diary throughout treatment to record your sleep patterns and progress.      How long will it take to work?    People often see improvement in their sleep within a few weeks. Research shows if you keep practicing the skills you learn your sleep is likely to continue to improve 6-12 months after treatment.    Are there side effects?    Unlike many prescribed sleeping pills, CBT-I is a safe treatment with few side effects.  During the first few weeks, you may experience an increase in daytime sleepiness.     What about sleep medication?    Some people choose to stop using sleep medication  prior to beginning CBT-I.  Others gradually reduce or stop using medication during treatment with the guidance of their prescribing provider. Always talk with you prescribing provider before making any changes to your medication.    How do I get started?    [facility] CBT-I begins with a pre-clinical phone visit with a member of our Sleep Therapy Management team.  During the phone visit, your care coordinator will discuss your treatment options, answer any questions and get you set to start sleep training. You can schedule your insomnia preclinical phone visit by calling the [facility] at [place] ([phone]) or [place] ([phone]).

## 2020-03-12 NOTE — PROGRESS NOTES
"[facility]   Outpatient Sleep Medicine Consultation  March 12, 2020      Name: Vijay Coreas MRN# [id number]   Age: 24 year old YOB: 1995     Date of Consultation: March 12, 2020  Consultation is requested by: Aden Buitrago PA-C  [address] [facility] [address]  [place] [address] Aden Buitrago  Primary care provider: Aden Buitrago       Chief Complaint / Reason for Sleep Consult:     \"Rough sleeping the past 5-6 years\"         History of Present Illness:     Vijya Coreas is a 24 year old male who presents to the clinic for evaluation of a 5-6 year history of unrefreshing sleep attributed to abnormal dreams. Other past medical history significant for anxiety, depression, and associated erectile dysfunction.     Patient's primary concern today is abnormal dreams and associated insomnia.  Vijay states that his father has a history of narcolepsy and is worried that his dreams may be the first symptom of his own narcolepsy. No sleep attacks or sudden urges to sleep in inappropriate situations. No hypnagogic/hypnopompic hallucinations.  No sleep paralysis.  No cataplexy.    Vijay has very \"extreme dreams\" at night. States they will either be \"very mundane or frightening\".  For example, his teeth will be shattered, he will be falling from a tall building, someone will be attacking him, or it can simply be a very realistic seeming dream of working a shift or reading a book.  There does not seem to be a recurring theme to his dreams, though if they are frightening it can cause him difficulty falling back asleep. He states that he can typically tell when he is dreaming, and will \"shake my head fast back-and-forth in the dream that wakes me up and can sometimes actually be shaking my head back and forth when I wake up in real life\".     Vijay does have a history of night terrors in early childhood.  Night terrors were successfully treated with \"biofeedback " "therapy\" and dietary restrictions (elimination of gluten, corn oil, lactose). History of somnambulism, no episodes in the past 5+ years.  Vijay does admit to talking in his sleep.  He can have conversations with his girlfriend that he has no recollection of the next morning, but this is rare.      Patient's sleep schedule varies slightly as he works as a  on Friday and Sunday nights until 1:30 AM, but typically gets into bed around 12:00 to 1:00 AM and will either read or be on his phone.  States that it can sometimes take him hours to fall asleep, but cannot give me a clear answer as to how long it takes him to fall asleep.  He will typically wake up anytime between 11:00 AM to 12:00 PM the next day.  He estimates that he gets an average of 7-8 hours of sleep per night but questions quality of his sleep given frequent dreaming.  States will wake up on average 3 times a night from his dreams and can have some difficulty falling back to sleep, though typically can fall asleep within 5 to 30 minutes.  He states he has a history of depressive and anxious tendencies, though does not think these are playing a role in nighttime awakenings.  Denies nocturia and pain or discomfort as causes of awakenings.  He tends to take a 2 to 3-hour nap daily Monday through Thursday, feels rested after napping.  Describes himself as more of a night person, with ideal sleep schedule of 12:00-1 AM to 9-10:00 AM.    No concern for sleep disordered breathing. Reports snoring only with URIs. No witnessed apneas. No gasp/choking episodes. Denies daytime sleepiness. Sleeps in all body positions, primarily on stomach and sides. Denies nocturia. Denies nocturnal GERD. He denies morning headaches or confusion.  No morning dry mouth.  No nasal/sinus congestion.     Denies dream enactment behavior. No sleep related eating. He denies bruxism.     Patient denies typical restless legs syndrome symptoms. No nocturnal leg movements.    SCALES    " "   SLEEP APNEA: Stopbang score  1/8 (male gender)       INSOMNIA:  Insomnia severity score: 17/28       SLEEPINESS: Reddick sleepiness scale: 5 [normal < 11]          Medications:     Current Outpatient Medications   Medication Sig     clindamycin (CLEOCIN T) 1 % solution Apply topically 2 times daily     NUTRITIONAL SUPPLEMENTS PO Patient states he is seeing a kinesiologist who has him taking \"various supplements and vitamins\"     atovaquone-proguanil (MALARONE) 250-100 MG tablet Take 1 tablet by mouth daily Start 2 days before exposure to Malaria and continue daily till  7 days after exposure. (Patient not taking: Reported on 3/12/2020)     methocarbamol (ROBAXIN) 500 MG tablet Take 1 tablet (500 mg) by mouth 4 times daily as needed for muscle spasms (Patient not taking: Reported on 3/12/2020)     permethrin (ELIMITE) 5 % cream Apply cream from head to toe (except the face); leave on for 8-14 hours then wash off with water; reapply in 1 week if live mites appear. (Patient not taking: Reported on 3/12/2020)     sildenafil (REVATIO) 20 MG tablet 20 mg 30 minutes prior to sexual activity. (Patient not taking: Reported on 3/12/2020)     typhoid (VIVOTIF) CR capsule Take 1 capsule by mouth every other day (Patient not taking: Reported on 3/12/2020)     No current facility-administered medications for this visit.              Allergies:     Allergies   Allergen Reactions     Corn Oil      Milk Products      Peanut Oil             Past Medical History:     Past Medical History:   Diagnosis Date     Anxiety      Depressive disorder              Past Surgical History:    Previous upper airway surgery none  Past Surgical History:   Procedure Laterality Date     NONE OF THE ABOVE CORE MEASURE DIAGNOSES              Social History:     Social History     Tobacco Use     Smoking status: Never Smoker     Smokeless tobacco: Never Used     Tobacco comment: maybe every once in a while will take a drag of someones cig   Substance " "Use Topics     Alcohol use: Yes     Comment: 2-3 drinks per week on average     Chemical History:  Alcohol use: 2-3 drinks per week, history of alcoholism in the family so generally limits  Tobacco use: \"maybe every once in a while will take a drag of someones cig\"  Illicit substances: None  Caffeine intake: Will drink approximately 2 cups of coffee per week         Family History:     Family History   Problem Relation Age of Onset     Family History Negative Mother      Narcolepsy Father       Sleep Family Hx: Father with a history of narcolepsy and sleep terrors.  Denies any family history of sleep apnea, restless legs syndrome, insomnia, or other parasomnia.         Review of Systems:   CONSTITUTIONAL: NEGATIVE for weight gain/loss, fever, chills, sweats or night sweats, drug allergies.  EYES: NEGATIVE for changes in vision, blind spots, double vision.  ENT: NEGATIVE for ear pain, sore throat, sinus pain, post-nasal drip, runny nose, bloody nose  CARDIAC: NEGATIVE for fast heartbeats or fluttering in chest, chest pain or pressure, breathlessness when lying flat, swollen legs or swollen feet.  NEUROLOGIC: NEGATIVE headaches, weakness or numbness in the arms or legs.  DERMATOLOGIC: NEGATIVE for rashes, new moles or change in mole(s)  PULMONARY: NEGATIVE SOB at rest, SOB with activity, dry cough, productive cough, coughing up blood, wheezing or whistling when breathing.    GASTROINTESTINAL:  POSITIVE for  abdominal pain and NEGATIVE for  nausea, vomiting, loose or watery stools, fat or grease in stools, constipation, bowel movements black in color and blood in stool  GENITOURINARY: NEGATIVE for pain during urination, blood in urine, urinating more frequently than usual, irregular menstrual periods.  MUSCULOSKELETAL:  POSITIVE for  muscle pain and bone or joint pain and NEGATIVE for  swollen joints  ENDOCRINE: NEGATIVE for increased thirst or urination, diabetes.  LYMPHATIC: NEGATIVE for swollen lymph nodes, lumps or " bumps in the breasts or nipple discharge.\  PSYCH: POSITIVE for anxiety and depression         Physical Examination:   /84   Pulse 74   Resp 16   Ht 1.829 m (6')   Wt 80.3 kg (177 lb)   SpO2 100%   BMI 24.01 kg/m    General appearance: Awake, alert, cooperative. Well groomed. Sitting comfortably in chair. In no apparent distress.  HEENT:   Head: Normocephalic, atraumatic.  Eyes: PERRL. Conjunctiva clear. Sclera normal  Nose: 1+B turbinate hypertrophy.  Nasal mucosa pink. No exudate.  No significant septal deviation noted.  Oropharynx:  Mallampati Classification:II.    Tonsils:3  extending beyond pillars  Neck: Supple, no adenopathy or thyroid enlargement. Neck Cir (cm): 38 cm (3/12/2020  1:14 PM)  Cardiovascular: Regular rate and rhythm. Regular S1 and S2. No gallops or murmurs. No pedal edema.  Pulmonary:  Chest symmetric. Clear to auscultation bilaterally. No crackles, wheezes or rales.  Skin:  Warm, dry, intact. No rashes or significant lesions.   Neurologic: Alert, oriented x3. No focal neurological deficit. Gait normal.   Psychiatric: Mood euthymic. Affect congruent with full range and intensity.         Data: All pertinent previous laboratory data reviewed     No results found for: PH, PHARTERIAL, PO2, [id number], SAT, PCO2, HCO3, BASEEXCESS, LEATHA, BEB  No results found for: TSH  No results found for: GLC  Lab Results   Component Value Date    HGB 16.4 (H) 02/10/2009     No results found for: BUN, CR  No results found for: AST, ALT, GGT, ALKPHOS, BILITOTAL, BILICONJ, BILIDIRECT, FAWN  No results found for: UAMP, UBARB, BENZODIAZEUR, UCANN, UCOC, OPIT, UPCP    Echocardiogram 12/27/2005: Normal intracardiac anatomy.  Good biventricular systolic function.           Assessment and Plan:   1. Insomnia, unspecified type  2. Abnormal dreams  3. Anxiety  4. Depression  Patient presents with concern about abnormal dreams and insomnia.  We discussed that he does not have any of the hallmark symptoms of  narcolepsy or any symptoms suggestive of a sleep-related breathing disorder and therefore agreed not to pursue a sleep study at this time.    Given that his dreams are significantly interfering with his ability to stay asleep and fall back to sleep once awakened, discussed the option of seeing our sleep psychologist Dr. Leobardo Akers for CBT-I and/or IRT.  Vijay was interested in pursuing this, referral placed today.     Patient to follow-up with sleep medicine clinic as needed.       Copy to: Aden Buitrago PA-C  Mar 12, 2020     Total time spent with patient: 55 min >50% counseling

## 2020-03-12 NOTE — TELEPHONE ENCOUNTER
Central Prior Authorization Team   Phone: [phone]      PA Initiation    Medication: typhoid (VIVOTIF) CR capsule -Initiated  Insurance Company: Blue Plus PMA - Phone [phone] Fax [phone]  Pharmacy Filling the Rx: [id number] - [place], [place] - [address] & [facility]  Filling Pharmacy Phone: [phone]  Filling Pharmacy Fax:    Start Date: 3/12/2020

## 2020-03-12 NOTE — NURSING NOTE
Chief Complaint   Patient presents with     Sleep Problem     Sleep not satisfying       Initial /84   Pulse 74   Resp 16   Ht 1.829 m (6')   Wt 80.3 kg (177 lb)   SpO2 100%   BMI 24.01 kg/m   Estimated body mass index is 24.01 kg/m  as calculated from the following:    Height as of this encounter: 1.829 m (6').    Weight as of this encounter: 80.3 kg (177 lb).    Medication Reconciliation: complete     ESS 5  Neck 38cm  Aileen Harrell MA

## 2020-03-13 NOTE — TELEPHONE ENCOUNTER
Prior Authorization Approval    Authorization Effective Date: 1/1/2020  Authorization Expiration Date: 3/12/2021  Medication: typhoid (VIVOTIF) CR capsule -APPROVED  Approved Dose/Quantity:   Reference #:     Insurance Company: Blue Plus PMAP - Phone [phone] Fax [phone]  Expected CoPay:       CoPay Card Available:      Foundation Assistance Needed:    Which Pharmacy is filling the prescription (Not needed for infusion/clinic administered): [id number] - [place], [place] - [address] [facility]  Pharmacy Notified: Yes  Patient Notified: No    Pharmacy will notify patient when medication is ready.

## 2020-11-01 ENCOUNTER — APPOINTMENT (OUTPATIENT)
Dept: CT IMAGING | Facility: CLINIC | Age: 25
End: 2020-11-01
Attending: EMERGENCY MEDICINE
Payer: COMMERCIAL

## 2020-11-01 ENCOUNTER — HOSPITAL ENCOUNTER (EMERGENCY)
Facility: CLINIC | Age: 25
Discharge: HOME | End: 2020-11-01
Attending: EMERGENCY MEDICINE | Admitting: EMERGENCY MEDICINE
Payer: COMMERCIAL

## 2020-11-01 VITALS
HEIGHT: 72 IN | OXYGEN SATURATION: 100 % | HEART RATE: 124 BPM | TEMPERATURE: 98.3 F | BODY MASS INDEX: 23.7 KG/M2 | WEIGHT: 175 LBS | SYSTOLIC BLOOD PRESSURE: 154 MMHG | DIASTOLIC BLOOD PRESSURE: 88 MMHG | RESPIRATION RATE: 16 BRPM

## 2020-11-01 DIAGNOSIS — R30.0 DYSURIA: ICD-10-CM

## 2020-11-01 LAB
ALBUMIN UR-MCNC: 30 MG/DL
APPEARANCE UR: CLEAR
BILIRUB UR QL STRIP: NEGATIVE
COLOR UR AUTO: YELLOW
GLUCOSE UR STRIP-MCNC: NEGATIVE MG/DL
HGB UR QL STRIP: ABNORMAL
KETONES UR STRIP-MCNC: 5 MG/DL
LEUKOCYTE ESTERASE UR QL STRIP: NEGATIVE
MUCOUS THREADS #/AREA URNS LPF: PRESENT /LPF
NITRATE UR QL: NEGATIVE
PH UR STRIP: 7 PH (ref 5–7)
RBC #/AREA URNS AUTO: 3 /HPF (ref 0–2)
SOURCE: ABNORMAL
SP GR UR STRIP: 1.03 (ref 1–1.03)
UROBILINOGEN UR STRIP-MCNC: 2 MG/DL (ref 0–2)
WBC #/AREA URNS AUTO: 2 /HPF (ref 0–5)

## 2020-11-01 PROCEDURE — 99284 EMERGENCY DEPT VISIT MOD MDM: CPT | Mod: 25

## 2020-11-01 PROCEDURE — 81001 URINALYSIS AUTO W/SCOPE: CPT | Performed by: EMERGENCY MEDICINE

## 2020-11-01 PROCEDURE — 74176 CT ABD & PELVIS W/O CONTRAST: CPT

## 2020-11-01 PROCEDURE — 87591 N.GONORRHOEAE DNA AMP PROB: CPT | Performed by: EMERGENCY MEDICINE

## 2020-11-01 PROCEDURE — 87491 CHLMYD TRACH DNA AMP PROBE: CPT | Performed by: EMERGENCY MEDICINE

## 2020-11-01 PROCEDURE — 250N000011 HC RX IP 250 OP 636: Performed by: EMERGENCY MEDICINE

## 2020-11-01 RX ORDER — ONDANSETRON 4 MG/1
4 TABLET, ORALLY DISINTEGRATING ORAL ONCE
Status: COMPLETED | OUTPATIENT
Start: 2020-11-01 | End: 2020-11-01

## 2020-11-01 RX ADMIN — ONDANSETRON 4 MG: 4 TABLET, ORALLY DISINTEGRATING ORAL at 01:04

## 2020-11-01 ASSESSMENT — ENCOUNTER SYMPTOMS
DYSURIA: 1
ABDOMINAL PAIN: 1

## 2020-11-01 ASSESSMENT — MIFFLIN-ST. JEOR: SCORE: 1811.79

## 2020-11-01 NOTE — ED PROVIDER NOTES
"  History     Chief Complaint:  Dysuria and abdominal pain    HPI   Vijay Coreas is a 25 year old male who presents for evaluation of dysuria and abdominal pain. The patient reports 4 days ago he developed discomfort with urination and felt \"funky\" in the pubic area and he noticed his urine was \"tea\" colored. He then states 3 days ago his symptoms persisted and 2 days ago he developed abdominal discomfort with associated bloating, gassiness, and dysuria. He notes he and his girlfriend have unprotected sexual intercourse and she recently had a UTI.     Allergies:  No known drug allergies      Medications:    Malarone   Robaxin   Revatio  Vivotif     Past Medical History:    Anxiety   Depressive disorder  ED    Past Surgical History:    History reviewed. No pertinent surgical history.     Family History:    Narcolepsy     Social History:  Smoking status- never smoker   Alcohol use- yes  Drug use- no   Marital Status:  Single     Review of Systems   Gastrointestinal: Positive for abdominal pain.   Genitourinary: Positive for dysuria.   All other systems reviewed and are negative.    Physical Exam     Patient Vitals for the past 24 hrs:   BP Temp Temp src Pulse Resp SpO2 Height Weight   11/01/20 0036 (!) 154/88 98.3  F (36.8  C) Oral 124 16 100 % 1.829 m (6') 79.4 kg (175 lb)     Physical Exam  Nursing note and vitals reviewed.  General: Oriented to person, place, and time. Appears well-developed and well-nourished.   Head: No signs of trauma.   Mouth/Throat: Oropharynx is clear and moist.   Eyes: Conjunctivae are normal. Pupils are equal, round, and reactive to light.   Neck: Normal range of motion. No nuchal rigidity.   Cardiovascular: Normal rate and regular rhythm.    Respiratory: Effort normal and breath sounds normal. No respiratory distress.   Abdominal: Soft. There is no tenderness. There is no guarding.   Musculoskeletal: Normal range of motion. no edema.   Neurological: The patient is alert and oriented " to person, place, and time.  PERRLA, EOMI, visual fields intact, strength in upper/lower extremities normal and symmetrical.   Sensation normal. Speech normal  GCS eye subscore is 4. GCS verbal subscore is 5. GCS motor subscore is 6.   Skin: Skin is warm and dry. No rash noted.   Psychiatric: normal mood and affect. behavior is normal.      Emergency Department Course     Imaging:  Radiology findings were communicated with the patient who voiced understanding of the findings.    CT Abdomen Pelvis wo contrast Stone protocol   1.  No bowel obstruction, colitis, diverticulitis, or appendicitis.  2.  Normal kidneys. No obstructing renal or ureteral stones. No hydroureteronephrosis.  Reading per radiology    Laboratory:  Laboratory findings were communicated with the patient who voiced understanding of the findings.    UA with Microscopic: ketones 5, blood - trace, Protein Albumin 30, RBC 3 (H), mucous - present, o/w negative     Chlamydia Trachomatis PCR: pending   Neisseria Gonorrhea PCR: pending      Interventions:  0104 Zofran 4 mg IV     Emergency Department Course:  Past medical records, nursing notes, and vitals reviewed.    0058 The patient provided a urine sample here in the emergency department. This was sent for laboratory testing, findings above.    0116 I performed an exam of the patient as documented above.     0145 Patient was sent for a CT abdomen pelvis, see results above.     0207 I rechecked the patient and discussed the results of the ED workup thus far.     Findings and plan explained to the Patient. Patient discharged home with instructions regarding supportive care, medications, and reasons to return. The importance of close follow-up was reviewed.     Impression & Plan   Medical Decision Making:  Vijay Coreas is a 25 year old male who presents for evaluation of dysuria. A broad differential was considered including UT, pyelonephritis, foreign body, internal dysuria from pelvis source such as  diverticulitis, etc.  UA is negative. Abd/pelvis exam is benign. Supportive outpatient management as risk of serious etiologies is low and further w/u and management per primary care physician.     Diagnosis:    ICD-10-CM   1. Dysuria  R30.0     Disposition:  Discharged to home.    Scribe Disclosure:  Tisha LLANES, am serving as a scribe at 12:56 AM on 11/1/2020 to document services personally performed by Wilfredo French MD based on my observations and the provider's statements to me.        Wilfredo French MD  11/01/20 0655

## 2020-11-01 NOTE — ED TRIAGE NOTES
UTI symptoms for 2-3 days. Some pain on urination, and now pain radiating to left flank. No blood seen in urine

## 2020-11-01 NOTE — ED AVS SNAPSHOT
[facility] Emergency Dept  [address] [facility] [address]  Phone: [phone]  Fax: [phone]                                    Vijay Coreas   MRN: [id number]    Department: [facility] Emergency Dept   Date of Visit: 11/1/2020           After Visit Summary Signature Page    I have received my discharge instructions, and my questions have been answered. I have discussed any challenges I see with this plan with the nurse or doctor.    ..........................................................................................................................................  Patient/Patient Representative Signature      ..........................................................................................................................................  Patient Representative Print Name and Relationship to Patient    ..................................................               ................................................  Date                                   Time    ..........................................................................................................................................  Reviewed by Signature/Title    ...................................................              ..............................................  Date                                               Time          22EPIC Rev 08/18

## 2020-11-02 ENCOUNTER — TELEPHONE (OUTPATIENT)
Dept: EMERGENCY MEDICINE | Facility: CLINIC | Age: 25
End: 2020-11-02

## 2020-11-02 ENCOUNTER — OFFICE VISIT (OUTPATIENT)
Dept: FAMILY MEDICINE | Facility: CLINIC | Age: 25
End: 2020-11-02
Payer: COMMERCIAL

## 2020-11-02 VITALS
BODY MASS INDEX: 25.42 KG/M2 | DIASTOLIC BLOOD PRESSURE: 82 MMHG | TEMPERATURE: 98.1 F | HEIGHT: 72 IN | RESPIRATION RATE: 20 BRPM | SYSTOLIC BLOOD PRESSURE: 134 MMHG | HEART RATE: 80 BPM | WEIGHT: 187.7 LBS | OXYGEN SATURATION: 95 %

## 2020-11-02 DIAGNOSIS — A74.9 CHLAMYDIA INFECTION: ICD-10-CM

## 2020-11-02 DIAGNOSIS — A74.9 CHLAMYDIA INFECTION: Primary | ICD-10-CM

## 2020-11-02 LAB
C TRACH DNA SPEC QL NAA+PROBE: POSITIVE
N GONORRHOEA DNA SPEC QL NAA+PROBE: NEGATIVE
SPECIMEN SOURCE: ABNORMAL
SPECIMEN SOURCE: NORMAL

## 2020-11-02 PROCEDURE — 99213 OFFICE O/P EST LOW 20 MIN: CPT | Performed by: PHYSICIAN ASSISTANT

## 2020-11-02 RX ORDER — AZITHROMYCIN 500 MG/1
1000 TABLET, FILM COATED ORAL DAILY
Qty: 2 TABLET | Refills: 0 | Status: SHIPPED | OUTPATIENT
Start: 2020-11-02 | End: 2020-11-03

## 2020-11-02 RX ORDER — FINASTERIDE 5 MG/1
5 TABLET, FILM COATED ORAL DAILY
COMMUNITY

## 2020-11-02 ASSESSMENT — MIFFLIN-ST. JEOR: SCORE: 1865.43

## 2020-11-02 NOTE — TELEPHONE ENCOUNTER
"[facility] Emergency Department Lab result notification [Adult-Male]    [facility] ED lab result protocol used  Chlamydia protocol    Reason for call  Notify of lab results, assess symptoms,  review ED providers recommendations/discharge instructions (if necessary) and advise per ED lab result f/u protocol    Lab Result (including Rx patient on, if applicable)  Final N. Gonorrhoeae PCR is [NEGATIVE] AND Chlamydia T PCR is [POSITIVE]  Patient was treated for N. Gonorrhea AND/OR Chlamydia T in the ED  [Yes or No]:  No       If Yes, list what was given in the ED (dual treatment for N Gonorrhea):  None  [facility] ED discharge antibiotic (if prescribed): None  If treated appropriately in the [facility] ED, notify patient of result and STD instructions.  If no treatment initiated in the [facility] ED, treat per [facility] ED Lab Result protocol.    Information table from ED Provider visit on 11/1/2020  Symptoms reported at ED visit (Chief complaint, HPI) Dysuria and abdominal pain     HPI   Vijay Coreas is a 25 year old male who presents for evaluation of dysuria and abdominal pain. The patient reports 4 days ago he developed discomfort with urination and felt \"funky\" in the pubic area and he noticed his urine was \"tea\" colored. He then states 3 days ago his symptoms persisted and 2 days ago he developed abdominal discomfort with associated bloating, gassiness, and dysuria. He notes he and his girlfriend have unprotected sexual intercourse and she recently had a UTI.    Significant Medical hx, if applicable (i.e. CKD, diabetes) None   Allergies Allergies   Allergen Reactions     Corn Oil      Milk Products      Peanut Oil       Weight, if applicable Wt Readings from Last 2 Encounters:   11/01/20 79.4 kg (175 lb)   03/12/20 80.3 kg (177 lb)      Coumadin/Warfarin [Yes /No] No   Creatinine Level (mg/dl) No results found for: CR   Creatinine clearance (ml/min), if applicable Creatinine clearance cannot " be calculated (No successful lab value found.)   ED providers Impression and Plan (applicable information) Vijay Coreas is a 25 year old male who presents for evaluation of dysuria. A broad differential was considered including UT, pyelonephritis, foreign body, internal dysuria from pelvis source such as diverticulitis, etc.  UA is negative. Abd/pelvis exam is benign. Supportive outpatient management as risk of serious etiologies is low and further w/u and management per primary care physician   ED diagnosis  Dysuria       ED provider Wilfredo French MD RN Assessment (Patient s current Symptoms), include time called.  [Insert Left message here if message left]  12:14PM: Spoke with patient.     RN Recommendations/Instructions per [place] ED lab result protocol  Patient notified of lab result . [facility] that [facility] and has already been in contact with his PCP and will be going to the clinic now for treatment.    STD Patient Instructions:    We recommend that you contact any recent sexual partners within the last 2 months and have them evaluated by a physician.    Avoid sexual activity for 7 to 10 days or until both your and your partner(s) have completed all antibiotic medications.    We advise that you consider following up with your PCP at approximately 3 months for retesting to be sure the infection has cleared.    The patient is comfortable with the information given and has no further questions.      Please Contact your PCP clinic or return to the Emergency department if your:    Symptoms return.    Symptoms worsen or other concerning symptom's.    PCP follow-up Questions asked: YES       [RN Name]  Madisyn Chaudhary RN  [facility] RN  Lung Nodule and ED Lab Result RN  Epic pool (ED late result f/u RN): [id number]  [facility] INCIDENTAL RADIOLOGY F/U NURSES: P [id number]  # [phone]      Copy of Lab result

## 2020-11-02 NOTE — PROGRESS NOTES
"Subjective     Vijaymarcela Coreas is a 25 year old male who presents to clinic today for the following health issues:    HPI         ED/UC Followup:    Facility:  [facility]  Date of visit: 11/1/2020  Reason for visit: dysuria  Current Status: still having sx, UA/labs positive for chlamydia, would like to discuss treatment           Review of Systems   Constitutional, HEENT, cardiovascular, pulmonary, gi and gu systems are negative, except as otherwise noted.      Objective    /82 (Patient Position: Sitting, Cuff Size: Adult Regular)   Pulse 80   Temp 98.1  F (36.7  C) (Tympanic)   Resp 20   Ht 1.822 m (5' 11.75\")   Wt 85.1 kg (187 lb 11.2 oz)   SpO2 95%   BMI 25.63 kg/m    Body mass index is 25.63 kg/m .  Physical Exam   GENERAL: alert and no distress  EYES: Eyes grossly normal to inspection  PSYCH: mentation appears normal and affect normal/bright    No results found for this or any previous visit (from the past 24 hour(s)).        Assessment & Plan     Chlamydia infection  Has follow up later this week for well exam.  - azithromycin (ZITHROMAX) 500 MG tablet; Take 2 tablets (1,000 mg) by mouth daily for 1 day     BMI:   Estimated body mass index is 25.63 kg/m  as calculated from the following:    Height as of this encounter: 1.822 m (5' 11.75\").    Weight as of this encounter: 85.1 kg (187 lb 11.2 oz).                Return in about 2 months (around 1/2/2021).    Aden Buitrago PA-C  [facility]    "

## 2020-11-04 ENCOUNTER — OFFICE VISIT (OUTPATIENT)
Dept: FAMILY MEDICINE | Facility: CLINIC | Age: 25
End: 2020-11-04
Payer: COMMERCIAL

## 2020-11-04 VITALS
OXYGEN SATURATION: 100 % | DIASTOLIC BLOOD PRESSURE: 82 MMHG | HEIGHT: 72 IN | TEMPERATURE: 97.3 F | HEART RATE: 71 BPM | BODY MASS INDEX: 25.6 KG/M2 | RESPIRATION RATE: 16 BRPM | WEIGHT: 189 LBS | SYSTOLIC BLOOD PRESSURE: 130 MMHG

## 2020-11-04 DIAGNOSIS — Z11.3 SCREEN FOR STD (SEXUALLY TRANSMITTED DISEASE): ICD-10-CM

## 2020-11-04 DIAGNOSIS — Z00.00 ROUTINE GENERAL MEDICAL EXAMINATION AT A HEALTH CARE FACILITY: Primary | ICD-10-CM

## 2020-11-04 LAB
CHOLEST SERPL-MCNC: 159 MG/DL
GLUCOSE SERPL-MCNC: 97 MG/DL (ref 70–99)
HDLC SERPL-MCNC: 52 MG/DL
LDLC SERPL CALC-MCNC: 90 MG/DL
NONHDLC SERPL-MCNC: 107 MG/DL
TRIGL SERPL-MCNC: 86 MG/DL

## 2020-11-04 PROCEDURE — 82947 ASSAY GLUCOSE BLOOD QUANT: CPT | Performed by: FAMILY MEDICINE

## 2020-11-04 PROCEDURE — 80061 LIPID PANEL: CPT | Performed by: FAMILY MEDICINE

## 2020-11-04 PROCEDURE — 86780 TREPONEMA PALLIDUM: CPT | Mod: 90 | Performed by: FAMILY MEDICINE

## 2020-11-04 PROCEDURE — 99395 PREV VISIT EST AGE 18-39: CPT | Performed by: FAMILY MEDICINE

## 2020-11-04 PROCEDURE — 87389 HIV-1 AG W/HIV-1&-2 AB AG IA: CPT | Performed by: FAMILY MEDICINE

## 2020-11-04 PROCEDURE — 86803 HEPATITIS C AB TEST: CPT | Performed by: FAMILY MEDICINE

## 2020-11-04 PROCEDURE — 99000 SPECIMEN HANDLING OFFICE-LAB: CPT | Performed by: FAMILY MEDICINE

## 2020-11-04 PROCEDURE — 36415 COLL VENOUS BLD VENIPUNCTURE: CPT | Performed by: FAMILY MEDICINE

## 2020-11-04 ASSESSMENT — ANXIETY QUESTIONNAIRES
5. BEING SO RESTLESS THAT IT IS HARD TO SIT STILL: NOT AT ALL
7. FEELING AFRAID AS IF SOMETHING AWFUL MIGHT HAPPEN: SEVERAL DAYS
2. NOT BEING ABLE TO STOP OR CONTROL WORRYING: SEVERAL DAYS
GAD7 TOTAL SCORE: 5
3. WORRYING TOO MUCH ABOUT DIFFERENT THINGS: SEVERAL DAYS
1. FEELING NERVOUS, ANXIOUS, OR ON EDGE: SEVERAL DAYS
6. BECOMING EASILY ANNOYED OR IRRITABLE: NOT AT ALL
IF YOU CHECKED OFF ANY PROBLEMS ON THIS QUESTIONNAIRE, HOW DIFFICULT HAVE THESE PROBLEMS MADE IT FOR YOU TO DO YOUR WORK, TAKE CARE OF THINGS AT HOME, OR GET ALONG WITH OTHER PEOPLE: NOT DIFFICULT AT ALL

## 2020-11-04 ASSESSMENT — MIFFLIN-ST. JEOR: SCORE: 1871.33

## 2020-11-04 ASSESSMENT — PATIENT HEALTH QUESTIONNAIRE - PHQ9
5. POOR APPETITE OR OVEREATING: SEVERAL DAYS
SUM OF ALL RESPONSES TO PHQ QUESTIONS 1-9: 4

## 2020-11-04 NOTE — PROGRESS NOTES
SUBJECTIVE:   CC: Vijay Coreas is an 25 year old male who presents for preventative health visit.       Patient has been advised of split billing requirements and indicates understanding: Yes  Healthy Habits:     Getting at least 3 servings of Calcium per day:  Yes    Bi-annual eye exam:  NO    Dental care twice a year:  NO    Sleep apnea or symptoms of sleep apnea:  None    Diet:  Low fat/cholesterol, Gluten-free/reduced and Breakfast skipped    Frequency of exercise:  6-7 days/week    Duration of exercise:  30-45 minutes    Taking medications regularly:  Yes    PHQ-2 Total Score: 2    Additional concerns today:  No              Today's PHQ-2 Score:   PHQ-2 ( 1999 Pfizer) 11/4/2020   Q1: Little interest or pleasure in doing things 1   Q2: Feeling down, depressed or hopeless 1   PHQ-2 Score 2   Q1: Little interest or pleasure in doing things Several days   Q2: Feeling down, depressed or hopeless Several days   PHQ-2 Score 2       Abuse: Current or Past(Physical, Sexual or Emotional)- No  Do you feel safe in your environment? Yes        Social History     Tobacco Use     Smoking status: Never Smoker     Smokeless tobacco: Never Used     Tobacco comment: maybe every once in a while will take a drag of someones cig   Substance Use Topics     Alcohol use: Yes     Comment: 2-3 drinks per week on average     If you drink alcohol do you typically have >3 drinks per day or >7 drinks per week? No    Alcohol Use 11/4/2020   Prescreen: >3 drinks/day or >7 drinks/week? No   Prescreen: >3 drinks/day or >7 drinks/week? -   No flowsheet data found.    Last PSA: No results found for: PSA    Reviewed orders with patient. Reviewed health maintenance and updated orders accordingly - Yes  Lab work is in process  Labs reviewed in EPIC  BP Readings from Last 3 Encounters:   11/04/20 130/82   11/02/20 134/82   11/01/20 (!) 154/88    Wt Readings from Last 3 Encounters:   11/04/20 85.7 kg (189 lb)   11/02/20 85.1 kg (187 lb 11.2 oz)  "  11/01/20 79.4 kg (175 lb)                  Patient Active Problem List   Diagnosis     Other abnormal heart sounds     Past Surgical History:   Procedure Laterality Date     NONE OF THE ABOVE CORE MEASURE DIAGNOSES         Social History     Tobacco Use     Smoking status: Never Smoker     Smokeless tobacco: Never Used     Tobacco comment: maybe every once in a while will take a drag of someones cig   Substance Use Topics     Alcohol use: Yes     Comment: 2-3 drinks per week on average     Family History   Problem Relation Age of Onset     Family History Negative Mother      Narcolepsy Father          Current Outpatient Medications   Medication Sig Dispense Refill     atovaquone-proguanil (MALARONE) 250-100 MG tablet Take 1 tablet by mouth daily Start 2 days before exposure to Malaria and continue daily till  7 days after exposure. 44 tablet 0     clindamycin (CLEOCIN T) 1 % solution Apply topically 2 times daily 60 mL 11     finasteride (PROSCAR) 5 MG tablet Take 5 mg by mouth daily       methocarbamol (ROBAXIN) 500 MG tablet Take 1 tablet (500 mg) by mouth 4 times daily as needed for muscle spasms 20 tablet 0     NUTRITIONAL SUPPLEMENTS PO Patient states he is seeing a kinesiologist who has him taking \"various supplements and vitamins\"       permethrin (ELIMITE) 5 % cream Apply cream from head to toe (except the face); leave on for 8-14 hours then wash off with water; reapply in 1 week if live mites appear. 60 g 1     sildenafil (REVATIO) 20 MG tablet 20 mg 30 minutes prior to sexual activity. 30 tablet 1     typhoid (VIVOTIF) CR capsule Take 1 capsule by mouth every other day 4 capsule 0     Allergies   Allergen Reactions     Corn Oil      Milk Products      Peanut Oil      No lab results found.     Reviewed and updated as needed this visit by clinical staff                 Reviewed and updated as needed this visit by Provider                Past Medical History:   Diagnosis Date     Anxiety      Depressive " disorder      Erectile dysfunction       Past Surgical History:   Procedure Laterality Date     NONE OF THE ABOVE CORE MEASURE DIAGNOSES         Review of Systems  CONSTITUTIONAL: NEGATIVE for fever, chills, change in weight  INTEGUMENTARY/SKIN: NEGATIVE for worrisome rashes, moles or lesions  EYES: NEGATIVE for vision changes or irritation  ENT: NEGATIVE for ear, mouth and throat problems  RESP: NEGATIVE for significant cough or SOB  CV: NEGATIVE for chest pain, palpitations or peripheral edema  GI: NEGATIVE for nausea, abdominal pain, heartburn, or change in bowel habits   male: negative for dysuria, hematuria, decreased urinary stream, erectile dysfunction, urethral discharge  MUSCULOSKELETAL: NEGATIVE for significant arthralgias or myalgia  NEURO: NEGATIVE for weakness, dizziness or paresthesias  PSYCHIATRIC: NEGATIVE for changes in mood or affect    OBJECTIVE:   There were no vitals taken for this visit.    Physical Exam  GENERAL: healthy, alert and no distress  EYES: Eyes grossly normal to inspection, PERRL and conjunctivae and sclerae normal  HENT: ear canals and TM's normal, nose and mouth without ulcers or lesions  NECK: no adenopathy, no asymmetry, masses, or scars and thyroid normal to palpation  RESP: lungs clear to auscultation - no rales, rhonchi or wheezes  CV: regular rate and rhythm, normal S1 S2, no S3 or S4, no murmur, click or rub, no peripheral edema and peripheral pulses strong  ABDOMEN: soft, nontender, no hepatosplenomegaly, no masses and bowel sounds normal  MS: no gross musculoskeletal defects noted, no edema  SKIN: no suspicious lesions or rashes  NEURO: Normal strength and tone, mentation intact and speech normal  PSYCH: mentation appears normal, affect normal/bright    Diagnostic Test Results:  Labs reviewed in Epic  Results for orders placed or performed in visit on 11/04/20   Lipid panel reflex to direct LDL Fasting     Status: None   Result Value Ref Range    Cholesterol 159 <200  "mg/dL    Triglycerides 86 <150 mg/dL    HDL Cholesterol 52 >39 mg/dL    LDL Cholesterol Calculated 90 <100 mg/dL    Non HDL Cholesterol 107 <130 mg/dL   Glucose     Status: None   Result Value Ref Range    Glucose 97 70 - 99 mg/dL   HIV Antigen Antibody Combo     Status: None   Result Value Ref Range    HIV Antigen Antibody Combo Nonreactive NR^Nonreactive       Treponema Abs w Reflex to RPR and Titer     Status: None   Result Value Ref Range    Treponema Antibodies Nonreactive NR^Nonreactive   Hepatitis C antibody     Status: None   Result Value Ref Range    Hepatitis C Antibody Nonreactive NR^Nonreactive       ASSESSMENT/PLAN:   1. Routine general medical examination at a health care facility  Will check fasting lipids and glucose   - Lipid panel reflex to direct LDL Fasting  - Glucose    2. Screen for STD (sexually transmitted disease)  Screening , he does not have any symptoms , treated for chlamydia two days ago   - HIV Antigen Antibody Combo  - Treponema Abs w Reflex to RPR and Titer  - Hepatitis C antibody    Patient has been advised of split billing requirements and indicates understanding: Yes  COUNSELING:   Reviewed preventive health counseling, as reflected in patient instructions       Regular exercise       Healthy diet/nutrition       Vision screening    Estimated body mass index is 25.63 kg/m  as calculated from the following:    Height as of 11/2/20: 1.822 m (5' 11.75\").    Weight as of 11/2/20: 85.1 kg (187 lb 11.2 oz).         He reports that he has never smoked. He has never used smokeless tobacco.      Counseling Resources:  ATP IV Guidelines  Pooled Cohorts Equation Calculator  FRAX Risk Assessment  ICSI Preventive Guidelines  Dietary Guidelines for Americans, 2010  USDA's MyPlate  ASA Prophylaxis  Lung CA Screening    Lidya Connor MD  [facility]  "

## 2020-11-05 LAB
HCV AB SERPL QL IA: NONREACTIVE
HIV 1+2 AB+HIV1 P24 AG SERPL QL IA: NONREACTIVE
T PALLIDUM AB SER QL: NONREACTIVE

## 2020-11-05 ASSESSMENT — ANXIETY QUESTIONNAIRES: GAD7 TOTAL SCORE: 5

## 2021-01-15 ENCOUNTER — HEALTH MAINTENANCE LETTER (OUTPATIENT)
Age: 26
End: 2021-01-15

## 2021-04-13 ENCOUNTER — OFFICE VISIT (OUTPATIENT)
Dept: FAMILY MEDICINE | Facility: CLINIC | Age: 26
End: 2021-04-13
Payer: COMMERCIAL

## 2021-04-13 VITALS
HEIGHT: 72 IN | HEART RATE: 65 BPM | WEIGHT: 188 LBS | RESPIRATION RATE: 16 BRPM | DIASTOLIC BLOOD PRESSURE: 79 MMHG | BODY MASS INDEX: 25.47 KG/M2 | OXYGEN SATURATION: 100 % | TEMPERATURE: 97.7 F | SYSTOLIC BLOOD PRESSURE: 116 MMHG

## 2021-04-13 DIAGNOSIS — Z11.3 SCREEN FOR STD (SEXUALLY TRANSMITTED DISEASE): Primary | ICD-10-CM

## 2021-04-13 DIAGNOSIS — N52.9 ERECTILE DYSFUNCTION, UNSPECIFIED ERECTILE DYSFUNCTION TYPE: ICD-10-CM

## 2021-04-13 PROCEDURE — 87591 N.GONORRHOEAE DNA AMP PROB: CPT | Performed by: FAMILY MEDICINE

## 2021-04-13 PROCEDURE — 99214 OFFICE O/P EST MOD 30 MIN: CPT | Performed by: FAMILY MEDICINE

## 2021-04-13 PROCEDURE — 87491 CHLMYD TRACH DNA AMP PROBE: CPT | Performed by: FAMILY MEDICINE

## 2021-04-13 RX ORDER — SILDENAFIL CITRATE 20 MG/1
TABLET ORAL
Qty: 30 TABLET | Refills: 1 | Status: SHIPPED | OUTPATIENT
Start: 2021-04-13

## 2021-04-13 ASSESSMENT — MIFFLIN-ST. JEOR: SCORE: 1870.76

## 2021-04-13 NOTE — PROGRESS NOTES
Assessment & Plan     Screen for STD (sexually transmitted disease)  Wants to screen , he has had chlamydia four months ago and was treated but some mild symptoms now , will check   - Chlamydia trachomatis PCR  - Neisseria gonorrhoeae PCR    Erectile dysfunction, unspecified erectile dysfunction type  He has used with good results in the past , only used sparingly and it works , no side effects   - sildenafil (REVATIO) 20 MG tablet; 20 mg 30 minutes prior to sexual activity.    Discussed the check the expiration date of the typhoid vaccine with the pharmacy .  He will discuss deeper sedation with his dentist for the up coming procedure .      RTC if no improving or worsening.  Pt is aware  and comfortable with the current plan.          Lidya Connor MD  [facility]   Vijay is a 25 year old who presents for the following health issues     HPI     Multiple concerns: dental STD HA  1) dental sedation   2) travel questions about travel vaccine   3) feeling with headaches and some irritation with voiding but less than before , some stomachache recently , hx of chlamydia 4 months ago and was treated and partner was treated but worried about completely cleared form before , frequency and urgency     Review of Systems   Constitutional, HEENT, cardiovascular, pulmonary, GI, , musculoskeletal, neuro, skin, endocrine and psych systems are negative, except as otherwise noted.      Objective    /79 (BP Location: Left arm, Cuff Size: Adult Large)   Pulse 65   Temp 97.7  F (36.5  C) (Tympanic)   Resp 16   Ht 1.829 m (6')   Wt 85.3 kg (188 lb)   SpO2 100%   BMI 25.50 kg/m    Body mass index is 25.5 kg/m .  Physical Exam   GENERAL: healthy, alert and no distress  EYES: Eyes grossly normal to inspection, PERRL and conjunctivae and sclerae normal  NECK: no adenopathy, no asymmetry, masses, or scars and thyroid normal to palpation  RESP: lungs clear to auscultation - no rales,  rhonchi or wheezes  CV: regular rate and rhythm, normal S1 S2, no S3 or S4, no murmur, click or rub, no peripheral edema and peripheral pulses strong  ABDOMEN: soft, nontender, no hepatosplenomegaly, no masses and bowel sounds normal  MS: no gross musculoskeletal defects noted, no edema    No results found for this or any previous visit (from the past 24 hour(s)).

## 2021-04-13 NOTE — NURSING NOTE
Chief Complaint   Patient presents with     STD     recheck     Headache     for 1 month     Imm/Inj     typhoid     Dental Problem     initial /79 (BP Location: Left arm, Cuff Size: Adult Large)   Pulse 65   Temp 97.7  F (36.5  C) (Tympanic)   Resp 16   Ht 1.829 m (6')   Wt 85.3 kg (188 lb)   SpO2 100%   BMI 25.50 kg/m   Estimated body mass index is 25.5 kg/m  as calculated from the following:    Height as of this encounter: 1.829 m (6').    Weight as of this encounter: 85.3 kg (188 lb).  BP completed using cuff size: large.  L  arm      Health Maintenance that is potentially due pending provider review:  NONE    n/a    Pierre De Los Santos ma

## 2021-04-14 LAB
C TRACH DNA SPEC QL NAA+PROBE: NEGATIVE
N GONORRHOEA DNA SPEC QL NAA+PROBE: NEGATIVE
SPECIMEN SOURCE: NORMAL
SPECIMEN SOURCE: NORMAL

## 2021-09-05 ENCOUNTER — HEALTH MAINTENANCE LETTER (OUTPATIENT)
Age: 26
End: 2021-09-05

## 2021-12-26 ENCOUNTER — HEALTH MAINTENANCE LETTER (OUTPATIENT)
Age: 26
End: 2021-12-26

## 2022-10-23 ENCOUNTER — HEALTH MAINTENANCE LETTER (OUTPATIENT)
Age: 27
End: 2022-10-23

## 2023-04-02 ENCOUNTER — HEALTH MAINTENANCE LETTER (OUTPATIENT)
Age: 28
End: 2023-04-02

## 2024-05-30 ENCOUNTER — TELEPHONE (OUTPATIENT)
Dept: FAMILY MEDICINE | Facility: CLINIC | Age: 29
End: 2024-05-30

## 2024-05-30 ENCOUNTER — OFFICE VISIT (OUTPATIENT)
Dept: FAMILY MEDICINE | Facility: CLINIC | Age: 29
End: 2024-05-30
Payer: COMMERCIAL

## 2024-05-30 VITALS
RESPIRATION RATE: 16 BRPM | HEART RATE: 84 BPM | HEIGHT: 72 IN | OXYGEN SATURATION: 98 % | WEIGHT: 183 LBS | SYSTOLIC BLOOD PRESSURE: 114 MMHG | TEMPERATURE: 98.2 F | BODY MASS INDEX: 24.79 KG/M2 | DIASTOLIC BLOOD PRESSURE: 79 MMHG

## 2024-05-30 DIAGNOSIS — M25.50 ARTHRALGIA, UNSPECIFIED JOINT: Primary | ICD-10-CM

## 2024-05-30 DIAGNOSIS — R51.9 NONINTRACTABLE EPISODIC HEADACHE, UNSPECIFIED HEADACHE TYPE: ICD-10-CM

## 2024-05-30 DIAGNOSIS — K64.4 EXTERNAL HEMORRHOIDS: ICD-10-CM

## 2024-05-30 PROCEDURE — 99204 OFFICE O/P NEW MOD 45 MIN: CPT | Performed by: PHYSICIAN ASSISTANT

## 2024-05-30 RX ORDER — HYDROCORTISONE ACETATE 25 MG/1
25 SUPPOSITORY RECTAL 2 TIMES DAILY
Qty: 12 SUPPOSITORY | Refills: 1 | Status: SHIPPED | OUTPATIENT
Start: 2024-05-30

## 2024-05-30 ASSESSMENT — ANXIETY QUESTIONNAIRES
6. BECOMING EASILY ANNOYED OR IRRITABLE: MORE THAN HALF THE DAYS
8. IF YOU CHECKED OFF ANY PROBLEMS, HOW DIFFICULT HAVE THESE MADE IT FOR YOU TO DO YOUR WORK, TAKE CARE OF THINGS AT HOME, OR GET ALONG WITH OTHER PEOPLE?: SOMEWHAT DIFFICULT
IF YOU CHECKED OFF ANY PROBLEMS ON THIS QUESTIONNAIRE, HOW DIFFICULT HAVE THESE PROBLEMS MADE IT FOR YOU TO DO YOUR WORK, TAKE CARE OF THINGS AT HOME, OR GET ALONG WITH OTHER PEOPLE: SOMEWHAT DIFFICULT
GAD7 TOTAL SCORE: 13
7. FEELING AFRAID AS IF SOMETHING AWFUL MIGHT HAPPEN: MORE THAN HALF THE DAYS
4. TROUBLE RELAXING: MORE THAN HALF THE DAYS
3. WORRYING TOO MUCH ABOUT DIFFERENT THINGS: MORE THAN HALF THE DAYS
1. FEELING NERVOUS, ANXIOUS, OR ON EDGE: MORE THAN HALF THE DAYS
2. NOT BEING ABLE TO STOP OR CONTROL WORRYING: MORE THAN HALF THE DAYS
GAD7 TOTAL SCORE: 13
GAD7 TOTAL SCORE: 13
7. FEELING AFRAID AS IF SOMETHING AWFUL MIGHT HAPPEN: MORE THAN HALF THE DAYS
5. BEING SO RESTLESS THAT IT IS HARD TO SIT STILL: SEVERAL DAYS

## 2024-05-30 ASSESSMENT — PATIENT HEALTH QUESTIONNAIRE - PHQ9
10. IF YOU CHECKED OFF ANY PROBLEMS, HOW DIFFICULT HAVE THESE PROBLEMS MADE IT FOR YOU TO DO YOUR WORK, TAKE CARE OF THINGS AT HOME, OR GET ALONG WITH OTHER PEOPLE: SOMEWHAT DIFFICULT
SUM OF ALL RESPONSES TO PHQ QUESTIONS 1-9: 9
SUM OF ALL RESPONSES TO PHQ QUESTIONS 1-9: 9

## 2024-05-30 ASSESSMENT — ENCOUNTER SYMPTOMS: HEADACHES: 1

## 2024-05-30 NOTE — PROGRESS NOTES
"  Assessment & Plan     Arthralgia, unspecified joint  Looking for more of a running focused approach  - Physical Therapy  Referral; Future    External hemorrhoids  Get these on occasion, would like to keep something on hand.  Discussed prevention strategies.  - hydrocortisone (ANUSOL-HC) 25 MG suppository; Place 1 suppository (25 mg) rectally 2 times daily    Nonintractable episodic headache, unspecified headache type  Ongoing and would like to discuss with neuro prior to any med trial.  - Adult Neurology  Referral; Future                Subjective   Vijay is a 29 year old, presenting for the following health issues:  Headache      5/30/2024     7:09 AM   Additional Questions   Roomed by Denae MOJICA MA   Accompanied by self         5/30/2024     7:09 AM   Patient Reported Additional Medications   Patient reports taking the following new medications none     Headache     History of Present Illness       Headaches:   Since the patient's last clinic visit, headaches are: worsened  The patient is getting headaches:  3 times per week avg.  He is not able to do normal daily activities when he has a migraine.  The patient is taking the following rescue/relief medications:  Ibuprofen (Advil, Motrin) and Excedrin   Patient states \"The relief is inconsistent\" from the rescue/relief medications.   The patient is taking the following medications to prevent migraines:  No medications to prevent migraines  In the past 4 weeks, the patient has gone to an Urgent Care or Emergency Room 0 times times due to headaches.    He eats 2-3 servings of fruits and vegetables daily.He consumes 1 sweetened beverage(s) daily.He exercises with enough effort to increase his heart rate 60 or more minutes per day.  He exercises with enough effort to increase his heart rate 6 days per week.   He is taking medications regularly.                Objective    Ht 1.822 m (5' 11.75\")   Wt 83 kg (183 lb)   BMI 24.99 kg/m    Body mass index " is 24.99 kg/m .  Physical Exam   GENERAL: alert and no distress  EYES: Eyes grossly normal to inspection  RESP: lungs clear to auscultation - no rales, rhonchi or wheezes  CV: regular rate and rhythm, normal S1 S2, no S3 or S4, no murmur, click or rub, no peripheral edema   PSYCH: mentation appears normal, affect normal/bright            Signed Electronically by: Aden Buitrago PA-C

## 2024-05-30 NOTE — TELEPHONE ENCOUNTER
Retail Pharmacy Prior Authorization Team   Phone: [phone]    Note: Due to record-high volumes, our turn-around time is taking longer than usual . We are currently 2 weeks behind in the pools.   We are working diligently to submit all requests in a timely manner and in the order they are received. Please only flag TRUE URGENT requests as high priority to the pool at this time.   If you have questions on status of PA's,  please send a note/message in the active PA encounter and send back to the [facility] PA pool [[phone]].    If you have questions about the turn-around time or about our process, please reach out to our supervisor Shannan Wood.   Thank you!   RPPA (Retail Pharmacy Prior Authorization) team

## 2024-05-30 NOTE — PROGRESS NOTES
"Preventive Care Visit  [facility]  Aden Buitrago PA-C, Family Medicine  May 30, 2024  {Provider  Link to SmartSet :202083}    {PROVIDER CHARTING PREFERENCE:816929}    Elaine Linares is a 29 year old, presenting for the following:  Physical    {(!) Visit Details have not yet been documented.  Please enter Visit Details and then use this list to pull in documentation. (Optional):670828}     Health Care Directive  Patient does not have a Health Care Directive or Living Will: {ADVANCE_DIRECTIVE_STATUS:673412}    Healthy Habits:     Taking medications regularly:  0  History of Present Illness       Headaches:   Since the patient's last clinic visit, headaches are: worsened  The patient is getting headaches:  3 times per week avg.  He is not able to do normal daily activities when he has a migraine.  The patient is taking the following rescue/relief medications:  Ibuprofen (Advil, Motrin) and Excedrin   Patient states \"The relief is inconsistent\" from the rescue/relief medications.   The patient is taking the following medications to prevent migraines:  No medications to prevent migraines  In the past 4 weeks, the patient has gone to an Urgent Care or Emergency Room 0 times times due to headaches.    He eats 2-3 servings of fruits and vegetables daily.He consumes 1 sweetened beverage(s) daily.He exercises with enough effort to increase his heart rate 60 or more minutes per day.  He exercises with enough effort to increase his heart rate 6 days per week.   He is taking medications regularly.    ***  {MA/LPN/RN Pre-Provider Visit Orders- hCG/UA/Strep (Optional):056315}  {SUPERLIST (Optional):064280}  {additonal problems for provider to add (Optional):986954}       No data to display                  11/4/2020   Nutrition   Three or more servings of calcium each day? Yes   Diet: low fat/cholesterol    gluten- free/reduced    breakfast skipped         11/4/2020   Exercise   Frequency of " exercise: 6-7 days/week            11/4/2020   Dental   Dentist two times every year? No          Today's PHQ-9 Score:       5/30/2024     7:00 AM   PHQ-9 SCORE   PHQ-9 Total Score MyChart 9 (Mild depression)   PHQ-9 Total Score 9         11/4/2020   Substance Use   Alcohol more than 3/day or more than 7/wk No     Social History     Tobacco Use    Smoking status: Never    Smokeless tobacco: Never    Tobacco comments:     maybe every once in a while will take a drag of someones cig   Substance Use Topics    Alcohol use: Yes     Comment: 2-3 drinks per week on average    Drug use: No     {Provider  If there are gaps in the social history shown above, please follow the link to update and then refresh the note Link to Social and Substance History :366966}         No data to display              {Provider  Use the storyboard to review patient history, after sections have been marked as reviewed, refresh note to capture documentation:472070}   Reviewed and updated as needed this visit by Provider                    {HISTORY OPTIONS (Optional):661154}    {ROS Picklists (Optional):130953}     Objective    Exam  There were no vitals taken for this visit.   Estimated body mass index is 25.5 kg/m  as calculated from the following:    Height as of 4/13/21: 1.829 m (6').    Weight as of 4/13/21: 85.3 kg (188 lb).    Physical Exam  {Exam Choices (Optional):197657}        Signed Electronically by: Aden Buitrago PA-C  {Email feedback regarding this note to [email or web address]   :008407}

## 2024-05-30 NOTE — COMMUNITY RESOURCES LIST (ENGLISH)
May 30, 2024           YOUR PERSONALIZED LIST OF SERVICES & PROGRAMS           & SHELTER    Housing      [facility] Hotline  [address] [place] [address] (Distance: 4.3 miles)  Phone: [phone]  Language: English  Accessibility: Translation services      Shelter Connect (ASC) - Adult Shelter Connect ([facility])  [address] [place] [address] (Distance: 3.9 miles)  Phone: [phone]  Website: https://www.[email or web address].org/our-programs/adult-shelter-connect-[place]-shelter/  Language: English, Brazilian  Fee: Free      Home Health Care [facility] - [facility]  Phone: [phone]  Website: https://www.zePASS/  Language: English, Hmong, Oromo, Surinamese, Brazilian  Hours: Mon 9:00 AM - 5:00 PM Tue 9:00 AM - 5:00 PM Wed 9:00 AM - 5:00 PM Thu 9:00 AM - 5:00 PM Fri 9:00 AM - 5:00 PM  Fee: Insurance  Accessibility: Blind accommodation, Deaf or hard of hearing, Translation services  Transportation Options: Free transportation    Case Management      [facility]  [address] [place] [address] (Distance: 6.4 miles)  Phone: [phone]  Website: https://www.SCL.Remote Assistant/contact  Language: English, Brazilian  Accessibility: Ada accessible, Blind accommodation, Deaf or hard of hearing, Translation services      Living - Housing Support-[facility] (HWS-I)  [address] [place] [address] (Distance: 3.1 miles)  Phone: [phone]  Website: https://www.SumoSkinny  Language: Azeri, English, Surinamese  Fee: Insurance      [employer] - Housing Stabilization Services  Phone: [phone]  Website: https://homebasemn.com/  Language: English  Hours: Mon 8:00 AM - 4:00 PM Tue 8:00 AM - 4:00 PM Wed 8:00 AM - 4:00 PM Thu 8:00 AM - 4:00 PM Fri 8:00 AM - 4:00 PM  Fee: Free  Accessibility: Blind accommodation, Deaf or hard of hearing  Transportation Options: Free  transportation    Drop-In Services      [facility] - Drop-in center or day shelter  [address] [place] [address] (Distance: 4.6 miles)  Phone: [phone]  Language: English  Fee: Free      [employer] - Drop-in center or day shelter  [address] [place] [address] (Distance: 4.3 miles)  Phone: [phone]) [phone]  Language: English  Fee: Free  Accessibility: Ada accessible, Translation services      [facility] POSTAL SERVICE - MAIL SERVICE FOR THE HOMELESS  Phone: (765) 801-8598  Website: https://wwwSyndiant               IMPORTANT NUMBERS & WEBSITES        Emergency Services  911  .   United Way  211 http://211unitedway.org  .   Poison Control  (669) 987-7006 http://[place]poison.org http://wis[place]poison.org  .     Suicide and Crisis Lifeline  988 http://988Oombaline.org  .   Childhelp [place] Child Abuse Hotline  806.211.1543 http://Childhelphotline.org   .   [place] Sexual Assault Hotline  (144) 916-8400 (HOPE) http://ADVANCE Medical.24x7 Learning   .     National Runaway Safeline  (721) 538-2815 (RUNAWAY) http://ImmunomedicsruLove Records MultiMedia.org  .   Pregnancy & Postpartum Support  Call/text 439-438-3018  [place]: http://ppsupportmn.org  [place]: http://Windgap Medical.com/wi  .   Substance Abuse National Helpline ([facility])  613-992-HELP (0280) http://Findtreatment.gov   .                DISCLAIMER: These resources have been generated via the [employer] Platform. [employer] does not endorse any service providers mentioned in this resource list. [employer] does not guarantee that the services mentioned in this resource list will be available to you or will improve your health or wellness.    [facility]

## 2024-06-02 ENCOUNTER — HEALTH MAINTENANCE LETTER (OUTPATIENT)
Age: 29
End: 2024-06-02

## 2024-06-10 NOTE — TELEPHONE ENCOUNTER
PA Initiation    Medication: HYDROCORTISONE ACETATE 25 MG RE SUPP  Insurance Company: [employer] - Phone [phone] Fax [phone]  Pharmacy Filling the Rx: [id number] - [place] [address] [facility] [address] [facility] [address]  Filling Pharmacy Phone: [phone]  Filling Pharmacy Fax: [phone]  Start Date: 6/10/2024        Note: Due to record-high volumes, our turn-around time is taking longer than usual . We are currently 2 weeks behind in the pools.   We are working diligently to submit all requests in a timely manner and in the order they are received. Please only flag TRUE URGENT requests as high priority to the pool at this time.   If you have questions on status of PA's,  please send a note/message in the active PA encounter and send back to the [facility] PA pool [[phone]].    If you have questions about the turn-around time or about our process, please reach out to our supervisor Shannan Wood.   Thank you!   RPPA (Retail Pharmacy Prior Authorization) team

## 2024-06-12 NOTE — TELEPHONE ENCOUNTER
JS,,    Please see below: medication was denied for patient.    If you would like to start appeal, please write Letter of Medical Necessity ID number [id number] under the letter tab and send back to [facility] PA MED POOL (P [id number])    Thank you,    Rima Ramirez RN

## 2024-06-12 NOTE — TELEPHONE ENCOUNTER
PRIOR AUTHORIZATION DENIED    Medication: HYDROCORTISONE ACETATE 25 MG RE SUPP  Insurance Company: [employer] - Phone [phone] Fax [phone]  Denial Date: 6/11/2024  Denial Reason(s):       Appeal Information:       Patient Notified: NO

## 2024-08-05 ENCOUNTER — THERAPY VISIT (OUTPATIENT)
Dept: PHYSICAL THERAPY | Facility: CLINIC | Age: 29
End: 2024-08-05
Attending: PHYSICIAN ASSISTANT
Payer: COMMERCIAL

## 2024-08-05 DIAGNOSIS — M25.50 ARTHRALGIA, UNSPECIFIED JOINT: ICD-10-CM

## 2024-08-05 PROCEDURE — 97161 PT EVAL LOW COMPLEX 20 MIN: CPT | Mod: GP | Performed by: PHYSICAL THERAPIST

## 2024-08-05 PROCEDURE — 97110 THERAPEUTIC EXERCISES: CPT | Mod: GP | Performed by: PHYSICAL THERAPIST

## 2024-08-05 ASSESSMENT — ACTIVITIES OF DAILY LIVING (ADL)
PAIN: THE SYMPTOM AFFECTS MY ACTIVITY MODERATELY
KNEE_ACTIVITY_OF_DAILY_LIVING_SUM: 2
PLEASE_INDICATE_YOR_PRIMARY_REASON_FOR_REFERRAL_TO_THERAPY:: KNEE
NECK_DISABILITY_INDEX:_COUNT: 5
HOW_WOULD_YOU_RATE_THE_OVERALL_FUNCTION_OF_YOUR_KNEE_DURING_YOUR_USUAL_DAILY_ACTIVITIES?: NEARLY NORMAL
PLEASE_INDICATE_YOR_PRIMARY_REASON_FOR_REFERRAL_TO_THERAPY:: UPPER BACK, AND/OR NECK
NECK_DISABILITY_TOTAL_SCORE: INCOMPLETE
PAIN: THE SYMPTOM AFFECTS MY ACTIVITY MODERATELY
NECK_DISABILITY_IN_PERCENT: INCOMPLETE
AT_ITS_WORST?: 6
PLEASE_INDICATE_YOR_PRIMARY_REASON_FOR_REFERRAL_TO_THERAPY:: SHOULDER
HOW_WOULD_YOU_RATE_THE_OVERALL_FUNCTION_OF_YOUR_KNEE_DURING_YOUR_USUAL_DAILY_ACTIVITIES?: NEARLY NORMAL

## 2024-08-05 NOTE — PROGRESS NOTES
PHYSICAL THERAPY EVALUATION  Type of Visit: Evaluation       Fall Risk Screen:  Fall screen completed by: PT  Have you fallen 2 or more times in the past year?: No  Have you fallen and had an injury in the past year?: No  Is patient a fall risk?: No    Subjective       Presenting condition or subjective complaint: running  Date of onset: 05/30/24    Relevant medical history:     Dates & types of surgery:      Prior diagnostic imaging/testing results:       Prior therapy history for the same diagnosis, illness or injury:        Prior Level of Function  Transfers:   Ambulation:   ADL:   IADL:     Living Environment  Social support: With a significant other or spouse   Type of home: Apartment/condo   Stairs to enter the home:         Ramp: No   Stairs inside the home: No       Help at home: None  Equipment owned:       Employment:      Hobbies/Interests:      Patient goals for therapy: no    Pain assessment: See objective evaluation for additional pain details     Objective      RUNNING EVALUATION  ADDITIONAL HISTORY:  How long have you been running? 20+ years  Most miles run consecutively: 30+  Total mileage run per week -   Current: 20-30   Prior to injury: -  Personal best time for races:   Marathon: na   10K: 55-roughly   5K: 22 roughly  Scheduled for upcoming race? Yes Yes  Surface generally run on: Paved road; Sidewalk; Grass; Gravel/Dirt; Track; Treadmill  Balanced diet? Yes  Quality of sleep: Fair  Summary of training philosophy: distance over speed  Previous running injuries: knee pain  Previous treatments for running injury: stretching/psy-therapy/aqua running  Current type of running shoe: Other i run almost all the above; it depends on the run  Miles run on current shoes: 150  Start of wear for current shoes: April  Orthotics? No    If has time runs 40-50 hours a week of running.  Has run a marathon distance.  Would like to do an Ultra marathon with his dad.  Does a lot of trail running.  Doing a 1/2 coming  up.  Runs daily 6 miles.  Runs 8:30 to 9.  Runs 7:20-7:40.  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  About to start law school.  Getting Masters in psychology.     General issues:  Notes right leg toeing in with fatigue.  Some knee pain, superior patella  Does pull up and push ups and planks.  Commutes on bike.  Has upper back tightness and migraine, left temporal.      Running Video Gait Analysis will do at next visit, did not bring running shoes  Injury: knee pain  Speed (MPH):    Pace(min/mile):     Shoe: trail running [place], has [employer] [place] or [employer] trainers and [place]    Orthotics: No    Shoe Type: Cushioned  Current Aundrea:   Pain:      Recommended Aundrea:    Pain:     Posterior/Back View:       Lateral/Side View:       Anterior/Front View:       Some decreased flexibility in the rectus femoris and psoas.    Decreased patellar inferior glide  Wide base stance with squat  Decreased right cervical rotation with pain on the right and increased tension noted right upper trapezius and elevated right first rib      Assessment & Plan   CLINICAL IMPRESSIONS  Medical Diagnosis: Arthralgia, unspecified joint    Treatment Diagnosis: Arthralgia, unspecified joint   Impression/Assessment: Patient is a 29 year old male with knee and neck complaints.  The following significant findings have been identified: Pain, Decreased ROM/flexibility, Decreased joint mobility, Decreased strength, and Decreased activity tolerance. These impairments interfere with their ability to perform self care tasks and community mobility as compared to previous level of function.     Clinical Decision Making (Complexity):  Clinical Presentation: Stable/Uncomplicated  Clinical Presentation Rationale: based on medical and personal factors listed in PT evaluation  Clinical Decision Making (Complexity): Low complexity    PLAN OF CARE  Treatment Interventions:  Interventions: Manual Therapy, Neuromuscular Re-education, Therapeutic  Activity, Therapeutic Exercise    Long Term Goals     PT Goal 1  Goal Identifier: running  Goal Description: able to run for 2 hours without pain (and training for ultramarathon)  Rationale: to maximize safety and independence with performance of ADLs and functional tasks  Goal Progress: currently can run for 2 hours but has intermittent knee pain and right greater than left neck pain  Target Date: 11/02/24      Frequency of Treatment: 2 times a month  Duration of Treatment: 3 months    Recommended Referrals to Other Professionals:   Education Assessment:   Learner/Method: Patient;Listening;Reading;Demonstration;Pictures/Video;No Barriers to Learning    Risks and benefits of evaluation/treatment have been explained.   Patient/Family/caregiver agrees with Plan of Care.     Evaluation Time:     PT Eval, Low Complexity Minutes (08948): 15       Signing Clinician: Cristine Howard PT        [facility]                                                                                   OUTPATIENT PHYSICAL THERAPY      PLAN OF TREATMENT FOR OUTPATIENT REHABILITATION   Patient's Last Name, First Name, M.IVijay Hardin YOB: 1995   Provider's Name   [facility]   Medical Record No.  [id number]     Onset Date: 05/30/24  Start of Care Date: 08/05/24     Medical Diagnosis:  Arthralgia, unspecified joint      PT Treatment Diagnosis:  Arthralgia, unspecified joint Plan of Treatment  Frequency/Duration: 2 times a month/ 3 months    Certification date from 08/05/24 to 11/02/24         See note for plan of treatment details and functional goals     Cristine Howard PT                         I CERTIFY THE NEED FOR THESE SERVICES FURNISHED UNDER        THIS PLAN OF TREATMENT AND WHILE UNDER MY CARE     (Physician attestation of this document indicates review and certification of the therapy plan).              Referring Provider:  Aden Mejia  [place]    Initial Assessment  See Epic Evaluation- Start of Care Date: 08/05/24

## 2024-08-14 ENCOUNTER — THERAPY VISIT (OUTPATIENT)
Dept: PHYSICAL THERAPY | Facility: CLINIC | Age: 29
End: 2024-08-14
Attending: PHYSICIAN ASSISTANT
Payer: COMMERCIAL

## 2024-08-14 DIAGNOSIS — M25.50 ARTHRALGIA, UNSPECIFIED JOINT: Primary | ICD-10-CM

## 2024-08-14 PROCEDURE — 97110 THERAPEUTIC EXERCISES: CPT | Mod: GP | Performed by: PHYSICAL THERAPIST

## 2024-08-21 ENCOUNTER — TELEPHONE (OUTPATIENT)
Dept: PHYSICAL THERAPY | Facility: CLINIC | Age: 29
End: 2024-08-21

## 2024-09-19 ENCOUNTER — THERAPY VISIT (OUTPATIENT)
Dept: PHYSICAL THERAPY | Facility: CLINIC | Age: 29
End: 2024-09-19
Payer: COMMERCIAL

## 2024-09-19 DIAGNOSIS — M25.50 ARTHRALGIA, UNSPECIFIED JOINT: Primary | ICD-10-CM

## 2024-09-19 PROCEDURE — 97110 THERAPEUTIC EXERCISES: CPT | Mod: GP | Performed by: PHYSICAL THERAPIST

## 2025-06-15 ENCOUNTER — HEALTH MAINTENANCE LETTER (OUTPATIENT)
Age: 30
End: 2025-06-15